# Patient Record
Sex: MALE | Race: WHITE | NOT HISPANIC OR LATINO | Employment: FULL TIME | URBAN - METROPOLITAN AREA
[De-identification: names, ages, dates, MRNs, and addresses within clinical notes are randomized per-mention and may not be internally consistent; named-entity substitution may affect disease eponyms.]

---

## 2017-12-27 ENCOUNTER — OFFICE VISIT (OUTPATIENT)
Dept: FAMILY MEDICINE | Facility: CLINIC | Age: 52
End: 2017-12-27

## 2017-12-27 VITALS
HEART RATE: 78 BPM | HEIGHT: 67 IN | DIASTOLIC BLOOD PRESSURE: 84 MMHG | TEMPERATURE: 98.5 F | WEIGHT: 193.4 LBS | BODY MASS INDEX: 30.35 KG/M2 | SYSTOLIC BLOOD PRESSURE: 132 MMHG | OXYGEN SATURATION: 95 %

## 2017-12-27 DIAGNOSIS — Z12.11 SPECIAL SCREENING FOR MALIGNANT NEOPLASMS, COLON: ICD-10-CM

## 2017-12-27 DIAGNOSIS — Z71.89 ACP (ADVANCE CARE PLANNING): ICD-10-CM

## 2017-12-27 DIAGNOSIS — R09.81 CONGESTION OF PARANASAL SINUS: ICD-10-CM

## 2017-12-27 DIAGNOSIS — Z00.00 ROUTINE GENERAL MEDICAL EXAMINATION AT A HEALTH CARE FACILITY: Primary | ICD-10-CM

## 2017-12-27 DIAGNOSIS — Z23 NEED FOR VACCINATION: ICD-10-CM

## 2017-12-27 PROCEDURE — 80053 COMPREHEN METABOLIC PANEL: CPT | Mod: 90 | Performed by: FAMILY MEDICINE

## 2017-12-27 PROCEDURE — 90715 TDAP VACCINE 7 YRS/> IM: CPT | Performed by: FAMILY MEDICINE

## 2017-12-27 PROCEDURE — 99386 PREV VISIT NEW AGE 40-64: CPT | Mod: 25 | Performed by: FAMILY MEDICINE

## 2017-12-27 PROCEDURE — 90471 IMMUNIZATION ADMIN: CPT | Performed by: FAMILY MEDICINE

## 2017-12-27 PROCEDURE — 82465 ASSAY BLD/SERUM CHOLESTEROL: CPT | Mod: 90 | Performed by: FAMILY MEDICINE

## 2017-12-27 PROCEDURE — 36415 COLL VENOUS BLD VENIPUNCTURE: CPT | Performed by: FAMILY MEDICINE

## 2017-12-27 PROCEDURE — 83718 ASSAY OF LIPOPROTEIN: CPT | Mod: 90 | Performed by: FAMILY MEDICINE

## 2017-12-27 PROCEDURE — 86803 HEPATITIS C AB TEST: CPT | Mod: 90 | Performed by: FAMILY MEDICINE

## 2017-12-27 PROCEDURE — 84153 ASSAY OF PSA TOTAL: CPT | Mod: 90 | Performed by: FAMILY MEDICINE

## 2017-12-27 NOTE — NURSING NOTE
"Jayjay Delcid is here for a CPX. Fasting: Not Today.    Pre-visit Planning  Immunizations -not up to date - Tdap Today  Colonoscopy -is due and to be scheduled by patient for later completion  Mammogram -NA  Asthma test --NA  PHQ9 -is completed today (Score: 0)  JULIETH 7 -NA  Fall Risk Assessment -NA    Vitals:  BP Cuff left  Arm with large Cuff  PULSE regular  193 lbs 6.4 oz and 5' 7.25\"  CLASSIFICATION OF OVERWEIGHT AND OBESITY BY BMI                        Obesity Class           BMI(kg/m2)  Underweight                                    < 18.5  Normal                                         18.5-24.9  Overweight                                     25.0-29.9  OBESITY                     I                  30.0-34.9                             II                 35.0-39.9  EXTREME OBESITY             III                >40      Patient's  BMI Body mass index is 30.07 kg/(m^2).  Http://hin.nhlbi.nih.gov/menuplanner/menu.cgi  My Chart: - accepts   Tobacco Use:  Questioned patient about current smoking habits.  Pt. has never smoked.  ETOH screening Questions:  1-How often do you have a drink containing alcohol?                             4 times per week(s)  2-How many drinks containing alcohol do you have on a typical day when you are         Drinking?                             1   3- How often do you have 5 or more drinks on one occasion?                             Never     Have you ever:  None of the patient's responses to the CAGE screening were positive / Negative CAGE score      The patient has verbalized that it is ok to leave a detailed voice message on the patient's cell phone with results/recommendations from this visit.     Verified Phone Number: [phone]    Roomed by: Lidia Sanders CMA      "

## 2017-12-27 NOTE — MR AVS SNAPSHOT
After Visit Summary   12/27/2017    Jayjay Delcid    MRN: [id number]           Patient Information     Date Of Birth          1965        Visit Information        Provider Department      12/27/2017 4:00 PM Chuck, Humberto Christopher, MD [facility]        Today's Diagnoses     Routine general medical examination at a health care facility    -  1    Congestion of paranasal sinus        ACP (advance care planning)        Need for vaccination        Special screening for malignant neoplasms, colon           Follow-ups after your visit        Additional Services     GASTROENTEROLOGY ADULT REF PROCEDURE ONLY       Last Lab Result: Creatinine (mg/dL)       Date                     Value                 09/17/2013               0.90             ----------  Body mass index is 30.07 kg/(m^2).     Needed:  No  Language:  English    Patient will be contacted to schedule procedure.     Please be aware that coverage of these services is subject to the terms and limitations of your health insurance plan.  Call member services at your health plan with any benefit or coverage questions.  Any procedures must be performed at a [facility] facility OR coordinated by your clinic's referral office.    Please bring the following with you to your appointment:    (1) Any X-Rays, CTs or MRIs which have been performed.  Contact the facility where they were done to arrange for  prior to your scheduled appointment.    (2) List of current medications   (3) This referral request   (4) Any documents/labs given to you for this referral                  Follow-up notes from your care team     Return in about 1 year (around 12/27/2018).      Who to contact     If you have questions or need follow up information about today's clinic visit or your schedule please contact [facility] directly at [phone].  Normal or non-critical lab and imaging results will be  "communicated to you by "[employer]hart, letter or phone within 4 business days after the clinic has received the results. If you do not hear from us within 7 days, please contact the clinic through [employer] or phone. If you have a critical or abnormal lab result, we will notify you by phone as soon as possible.  Submit refill requests through [employer] or call your pharmacy and they will forward the refill request to us. Please allow 3 business days for your refill to be completed.          Additional Information About Your Visit        [employer] Information     [employer] gives you secure access to your electronic health record. If you see a primary care provider, you can also send messages to your care team and make appointments. If you have questions, please call your primary care clinic.  If you do not have a primary care provider, please call [phone] and they will assist you.        Care EveryWhere ID     This is your Care EveryWhere ID. This could be used by other organizations to access your [place] medical records  [id number]        Your Vitals Were     Pulse Temperature Height Pulse Oximetry BMI (Body Mass Index)       78 98.5  F (36.9  C) (Oral) 1.708 m (5' 7.25\") 95% 30.07 kg/m2        Blood Pressure from Last 3 Encounters:   12/27/17 132/84   11/01/13 128/74   09/17/13 114/76    Weight from Last 3 Encounters:   12/27/17 87.7 kg (193 lb 6.4 oz)   11/01/13 74.2 kg (163 lb 9.6 oz)   09/17/13 72.6 kg (160 lb)              We Performed the Following     Cholesterol (QUEST)     COMPREHENSIVE METABOLIC PANEL (QUEST) [facility]     GASTROENTEROLOGY ADULT REF PROCEDURE ONLY     HCL PSA, SCREENING (QUEST)     HDL cholesterol (QUEST)     Hepatits C antibody (QUEST)     TDAP VACCINE (BOOSTRIX)     VACCINE ADMINISTRATION, INITIAL     VENOUS COLLECTION        Primary Care Provider Office Phone # Fax #    Humberto Woods [phone] [address] [address]        Equal Access " to Services     BAYLEE ZHU : Maggie Delgadillo, kota stephens, melinda hicks. So [facility] [phone].    ATENCIÓN: Si habla español, tiene a gallegos disposición servicios gratuitos de asistencia lingüística. Llame al [phone].    We comply with applicable federal civil rights laws and [place] laws. We do not discriminate on the basis of race, color, national origin, age, disability, sex, sexual orientation, or gender identity.            Thank you!     Thank you for choosing [facility]  for your care. Our goal is always to provide you with excellent care. Hearing back from our patients is one way we can continue to improve our services. Please take a few minutes to complete the written survey that you may receive in the mail after your visit with us. Thank you!             Your Updated Medication List - Protect others around you: Learn how to safely use, store and throw away your medicines at www.disposemymeds.org.      Notice  As of 12/27/2017  5:04 PM    You have not been prescribed any medications.

## 2017-12-28 LAB
ABBOTT PSA - QUEST: 0.4 NG/ML
ALBUMIN SERPL-MCNC: 4.5 G/DL (ref 3.6–5.1)
ALBUMIN/GLOB SERPL: 1.8 (CALC) (ref 1–2.5)
ALP SERPL-CCNC: 65 U/L (ref 40–115)
ALT SERPL-CCNC: 37 U/L (ref 9–46)
AST SERPL-CCNC: 28 U/L (ref 10–35)
BILIRUB SERPL-MCNC: 0.6 MG/DL (ref 0.2–1.2)
BUN SERPL-MCNC: 18 MG/DL (ref 7–25)
BUN/CREATININE RATIO: ABNORMAL (CALC) (ref 6–22)
CALCIUM SERPL-MCNC: 9.3 MG/DL (ref 8.6–10.3)
CHLORIDE SERPLBLD-SCNC: 105 MMOL/L (ref 98–110)
CHOLEST SERPL-MCNC: 230 MG/DL
CO2 SERPL-SCNC: 27 MMOL/L (ref 20–31)
CREAT SERPL-MCNC: 1.15 MG/DL (ref 0.7–1.33)
EGFR AFRICAN AMERICAN - QUEST: 83 ML/MIN/1.73M2
GFR SERPL CREATININE-BSD FRML MDRD: 72 ML/MIN/1.73M2
GLOBULIN, CALCULATED - QUEST: 2.5 G/DL (CALC) (ref 1.9–3.7)
GLUCOSE - QUEST: 102 MG/DL (ref 65–99)
HCV AB - QUEST: NORMAL
HDLC SERPL-MCNC: 33 MG/DL
POTASSIUM SERPL-SCNC: 4.1 MMOL/L (ref 3.5–5.3)
PROT SERPL-MCNC: 7 G/DL (ref 6.1–8.1)
SIGNAL TO CUT OFF - QUEST: 0.01
SODIUM SERPL-SCNC: 142 MMOL/L (ref 135–146)

## 2018-01-03 ENCOUNTER — OFFICE VISIT (OUTPATIENT)
Dept: FAMILY MEDICINE | Facility: CLINIC | Age: 53
End: 2018-01-03

## 2018-01-03 VITALS
HEIGHT: 68 IN | TEMPERATURE: 98.5 F | OXYGEN SATURATION: 97 % | HEART RATE: 66 BPM | WEIGHT: 191.4 LBS | DIASTOLIC BLOOD PRESSURE: 70 MMHG | SYSTOLIC BLOOD PRESSURE: 122 MMHG | BODY MASS INDEX: 29.01 KG/M2

## 2018-01-03 DIAGNOSIS — L91.8 SKIN TAG: Primary | ICD-10-CM

## 2018-01-03 PROCEDURE — 11200 RMVL SKIN TAGS UP TO&INC 15: CPT | Performed by: FAMILY MEDICINE

## 2018-01-03 NOTE — NURSING NOTE
Jayjay is here for skin tags he has on both eyes. Pt would like them removed    Pre-Visit Screening :  Immunizations : up to date  Colon Screening : is up to date  Asthma Action Test/Plan : tri  PHQ9/GAD7 :  Na    Pulse - regular  My Chart - accepts    CLASSIFICATION OF OVERWEIGHT AND OBESITY BY BMI                         Obesity Class           BMI(kg/m2)  Underweight                                    < 18.5  Normal                                         18.5-24.9  Overweight                                     25.0-29.9  OBESITY                     I                  30.0-34.9                              II                 35.0-39.9  EXTREME OBESITY             III                >40                             Patient's  BMI Body mass index is 22.15 kg/(m^2).  http://hin.nhlbi.nih.gov/menuplanner/menu.cgi  Questioned patient about current smoking habits.  Pt. has never smoked.  The patient has verbalized that it is ok to leave a detailed voice message on the patient's cell phone with results/recommendations from this visit.       Verified [phone] phone number:

## 2018-01-03 NOTE — MR AVS SNAPSHOT
"              After Visit Summary   1/3/2018    Jayjay Delcid    MRN: [id number]           Patient Information     Date Of Birth          1965        Visit Information        Provider Department      1/3/2018 5:00 PM Humberto Woods MD; [facility] PROCEDURE ROOM [facility]        Today's Diagnoses     Skin tag    -  1       Follow-ups after your visit        Who to contact     If you have questions or need follow up information about today's clinic visit or your schedule please contact [facility] directly at [phone].  Normal or non-critical lab and imaging results will be communicated to you by [employer], letter or phone within 4 business days after the clinic has received the results. If you do not hear from us within 7 days, please contact the clinic through [employer] or phone. If you have a critical or abnormal lab result, we will notify you by phone as soon as possible.  Submit refill requests through [employer] or call your pharmacy and they will forward the refill request to us. Please allow 3 business days for your refill to be completed.          Additional Information About Your Visit        MyChart Information     [employer] gives you secure access to your electronic health record. If you see a primary care provider, you can also send messages to your care team and make appointments. If you have questions, please call your primary care clinic.  If you do not have a primary care provider, please call [phone] and they will assist you.        Care EveryWhere ID     This is your Care EveryWhere ID. This could be used by other organizations to access your [place] medical records  [id number]        Your Vitals Were     Pulse Temperature Height Pulse Oximetry BMI (Body Mass Index)       66 98.5  F (36.9  C) (Oral) 1.727 m (5' 8\") 97% 29.1 kg/m2        Blood Pressure from Last 3 Encounters:   01/03/18 122/70   12/27/17 132/84   11/01/13 128/74    Weight " from Last 3 Encounters:   01/03/18 86.8 kg (191 lb 6.4 oz)   12/27/17 87.7 kg (193 lb 6.4 oz)   11/01/13 74.2 kg (163 lb 9.6 oz)              We Performed the Following     REMOVAL OF SKIN TAGS, FIRST 15        Primary Care Provider Office Phone # Fax #    Humberto Woods [phone] [address]  [facility] [address]        Equal Access to Services     MOOKIE ZHU : Hadii aad ku hadasho Soomaali, waaxda luqadaha, qaybta kaalmada adeegyada, waxay idiin hayaan adefabiola dozier . So [facility] [phone].    ATENCIÓN: Si habla español, tiene a gallegos disposición servicios gratuitos de asistencia lingüística. Llame al [phone].    We comply with applicable federal civil rights laws and [place] laws. We do not discriminate on the basis of race, color, national origin, age, disability, sex, sexual orientation, or gender identity.            Thank you!     Thank you for choosing [facility]  for your care. Our goal is always to provide you with excellent care. Hearing back from our patients is one way we can continue to improve our services. Please take a few minutes to complete the written survey that you may receive in the mail after your visit with us. Thank you!             Your Updated Medication List - Protect others around you: Learn how to safely use, store and throw away your medicines at www.disposemymeds.org.      Notice  As of 1/3/2018  5:36 PM    You have not been prescribed any medications.

## 2018-01-03 NOTE — PROGRESS NOTES
S: The patient complains of symptomatic skin tags on the eyelids. These are irritated by blinking and rubbing. One on the left bleeds easily    O: Patient appears well. Several benign skin tags are noted on the right and left eyelids-there also appears to be a keratin plug on right upper.     A: Skin tags     P: After verbal consent obtained including risks pain, bleeding, infection, scarring- Skin tags are snipped off using Betadine for cleansing and sterile iris scissors. Local anesthesia was not used due to location near eye. These pathognomonic lesions are not sent for pathology. Monsels was used to stop bleeding on largest lesion    abx ointment applied, .watch for signs infection

## 2018-03-16 ENCOUNTER — TRANSFERRED RECORDS (OUTPATIENT)
Dept: FAMILY MEDICINE | Facility: CLINIC | Age: 53
End: 2018-03-16

## 2019-12-16 ENCOUNTER — HEALTH MAINTENANCE LETTER (OUTPATIENT)
Age: 54
End: 2019-12-16

## 2020-06-25 ENCOUNTER — OFFICE VISIT (OUTPATIENT)
Dept: FAMILY MEDICINE | Facility: CLINIC | Age: 55
End: 2020-06-25

## 2020-06-25 VITALS
DIASTOLIC BLOOD PRESSURE: 80 MMHG | OXYGEN SATURATION: 95 % | HEART RATE: 82 BPM | WEIGHT: 194.8 LBS | TEMPERATURE: 98.2 F | SYSTOLIC BLOOD PRESSURE: 130 MMHG | BODY MASS INDEX: 29.62 KG/M2

## 2020-06-25 DIAGNOSIS — L03.119 CELLULITIS AND ABSCESS OF LEG: Primary | ICD-10-CM

## 2020-06-25 DIAGNOSIS — L02.419 CELLULITIS AND ABSCESS OF LEG: Primary | ICD-10-CM

## 2020-06-25 PROCEDURE — 10060 I&D ABSCESS SIMPLE/SINGLE: CPT | Performed by: FAMILY MEDICINE

## 2020-06-25 RX ORDER — CEPHALEXIN 500 MG/1
500 CAPSULE ORAL 3 TIMES DAILY
Qty: 21 CAPSULE | Refills: 0 | Status: SHIPPED | OUTPATIENT
Start: 2020-06-25 | End: 2021-10-25

## 2020-06-25 NOTE — PROGRESS NOTES
"SUBJECTIVE: Jayjay Delcid is a 55 year old male who presents for \"boil\" on left posterior thigh for 1 week, not improing with warm compress.  It is painful. No fevers or chills    Patient Active Problem List   Diagnosis     Health Care Home     ACP (advance care planning)     History reviewed. No pertinent past medical history.  Family History   Problem Relation Age of Onset     Breast Cancer Mother      Hypertension Father      Breast Cancer Paternal Grandmother      Cerebrovascular Disease No family hx of      C.A.D. No family hx of      Diabetes No family hx of      Cancer - colorectal No family hx of      Coronary Artery Disease No family hx of      Prostate Cancer No family hx of      Colon Cancer No family hx of      Social History     Socioeconomic History     Marital status:      Spouse name: Not on file     Number of children: Not on file     Years of education: Not on file     Highest education level: Not on file   Occupational History     Occupation:      Employer: [employer]   Social Needs     Financial resource strain: Not on file     Food insecurity     Worry: Not on file     Inability: Not on file     Transportation needs     Medical: Not on file     Non-medical: Not on file   Tobacco Use     Smoking status: Never Smoker     Smokeless tobacco: Never Used   Substance and Sexual Activity     Alcohol use: Yes     Alcohol/week: 1.7 standard drinks     Types: 2 drink(s) per week     Drug use: No     Sexual activity: Yes     Partners: Female     Birth control/protection: Condom, Diaphragm   Lifestyle     Physical activity     Days per week: Not on file     Minutes per session: Not on file     Stress: Not on file   Relationships     Social connections     Talks on phone: Not on file     Gets together: Not on file     Attends Mandaeism service: Not on file     Active member of club or organization: Not on file     Attends meetings of clubs or organizations: Not on file     " Relationship status: Not on file     Intimate partner violence     Fear of current or ex partner: Not on file     Emotionally abused: Not on file     Physically abused: Not on file     Forced sexual activity: Not on file   Other Topics Concern     Parent/sibling w/ CABG, MI or angioplasty before 65F 55M? Not Asked   Social History Narrative     Not on file     Past Surgical History:   Procedure Laterality Date     NO HISTORY OF SURGERY       No current outpatient medications on file prior to visit.  No current facility-administered medications on file prior to visit.        Allergies: Patient has no known allergies.    Immunization History   Administered Date(s) Administered     FLU 6-35 months 10/28/2009     Influenza (H1N1) 01/19/2010     Influenza (IIV3) PF 10/28/2009, 11/04/2010, 09/27/2011, 10/02/2013     Influenza Vaccine IM > 6 months Valent IIV4 10/01/2017, 10/02/2018, 10/03/2019     Influenza Vaccine IM Ages 6-35 Months 4 Valent (PF) 10/17/2017     TD (ADULT, 7+) 11/01/2006     TDAP Vaccine (Boostrix) 12/27/2017     Td (Adult), Adsorbed 11/01/2006        OBJECTIVE: /80 (BP Location: Right arm, Patient Position: Sitting, Cuff Size: Adult Large)   Pulse 82   Temp 98.2  F (36.8  C) (Oral)   Wt 88.4 kg (194 lb 12.8 oz)   SpO2 95%   BMI 29.62 kg/m       :   We have already discussed this procedure, including option of not performing surgery, technique of surgery and potential for scarring at a recent visit.    OBJECTIVE:   Patient appears well. Vitals are normal.  Skin: left upper posterior leg, 3 cm abscess, minimally fluctuant, tender, appears to have puruent matter below surface    ASSESSMENT:   skin abscess    PLAN:   After informed consent was obtained, using Betadine for cleansing and 1% Lidocaine with epinephrine for anesthetic, with sterile technique, incision and drainage of abscess was performed-moderate purulent matter drained, still some deep cavities palpable, sterile gauze wick pllace.  Antibiotic dressing is applied, and wound care instructions provided.  Be alert for any signs of cutaneous infection. The procedure was well tolerated without complications. Follow up: The patient may return prn..    abx started, f/u Monday for recheck

## 2020-06-25 NOTE — NURSING NOTE
Jayjay is here for boil on upper left thigh    Pre-visit Screening:  Immunizations:  up to date  Colonoscopy:  is up to date  Mammogram: NA  Asthma Action Test/Plan:  NA  PHQ9:  NA  GAD7:  NA  Questioned patient about current smoking habits Pt. has never smoked.  Ok to leave detailed message on voice mail for today's visit only Yes, phone # [phone]

## 2020-06-29 ENCOUNTER — OFFICE VISIT (OUTPATIENT)
Dept: FAMILY MEDICINE | Facility: CLINIC | Age: 55
End: 2020-06-29

## 2020-06-29 VITALS
DIASTOLIC BLOOD PRESSURE: 76 MMHG | HEIGHT: 68 IN | WEIGHT: 193 LBS | TEMPERATURE: 98.6 F | RESPIRATION RATE: 20 BRPM | HEART RATE: 61 BPM | OXYGEN SATURATION: 96 % | SYSTOLIC BLOOD PRESSURE: 122 MMHG | BODY MASS INDEX: 29.25 KG/M2

## 2020-06-29 DIAGNOSIS — L02.33 CARBUNCLE AND FURUNCLE OF BUTTOCK: Primary | ICD-10-CM

## 2020-06-29 PROCEDURE — 99024 POSTOP FOLLOW-UP VISIT: CPT | Performed by: FAMILY MEDICINE

## 2020-06-29 ASSESSMENT — MIFFLIN-ST. JEOR: SCORE: 1674.94

## 2020-06-29 NOTE — PROGRESS NOTES
SUBJECTIVE: 55 year old male complaining of recent incision and drainage of a boil with iodoform gauze inserted in the cavity for 4 day(s).   The patient describes marked improvement in pain, minimal discharge for now and tolerating antibiotic.   The patient denies a history of side effects.   Smoking history: No.   Relevant past medical history: negative.    OBJECTIVE: The patient appears healthy, alert, no distress, cooperative, smiling and over weight.   EXT: Right upper leg open incision with gauze noted. Minimal surrounding erythema. The lower extremities are normal and reveal no sign of DVT. Calves and thighs are soft and non tender, color is normal, no swelling or redness. Jacquie's sign is negative.  Pedal pulses are normal.    Area cleaned, gauze removed with minimal discharge  Dressing applied    ASSESSMENT: (L02.33) Carbuncle and furuncle of buttock  (primary encounter diagnosis)  Comment: gentle skin care/ dressing  Plan: finish out antibiotic. Expected healing course/ recheck prn problems.

## 2020-06-29 NOTE — NURSING NOTE
Chief Complaint   Patient presents with     RECHECK     recheck boil on left upper leg, taking gauze out today     Pre-visit Screening:  Immunizations:  up to date  Colonoscopy:  is up to date  Mammogram: NA  Asthma Action Test/Plan:  NA  PHQ9:  NA  GAD7:  NA  Questioned patient about current smoking habits Pt. has never smoked.  Ok to leave detailed message on voice mail for today's visit only Yes, phone # [phone]

## 2020-06-29 NOTE — PATIENT INSTRUCTIONS
Carbuncle and furuncle of buttock  (primary encounter diagnosis)  Comment: gentle skin care/ dressing  Plan: finish out antibiotic. Expected healing course/ recheck prn problems.

## 2021-01-15 ENCOUNTER — HEALTH MAINTENANCE LETTER (OUTPATIENT)
Age: 56
End: 2021-01-15

## 2021-09-04 ENCOUNTER — HEALTH MAINTENANCE LETTER (OUTPATIENT)
Age: 56
End: 2021-09-04

## 2021-10-25 ENCOUNTER — OFFICE VISIT (OUTPATIENT)
Dept: FAMILY MEDICINE | Facility: CLINIC | Age: 56
End: 2021-10-25

## 2021-10-25 VITALS
BODY MASS INDEX: 30.01 KG/M2 | OXYGEN SATURATION: 96 % | TEMPERATURE: 97.9 F | HEART RATE: 60 BPM | SYSTOLIC BLOOD PRESSURE: 120 MMHG | HEIGHT: 68 IN | DIASTOLIC BLOOD PRESSURE: 62 MMHG | WEIGHT: 198 LBS

## 2021-10-25 DIAGNOSIS — J20.9 ACUTE BRONCHITIS, UNSPECIFIED ORGANISM: Primary | ICD-10-CM

## 2021-10-25 PROCEDURE — 99213 OFFICE O/P EST LOW 20 MIN: CPT | Performed by: PHYSICIAN ASSISTANT

## 2021-10-25 RX ORDER — AZITHROMYCIN 250 MG/1
TABLET, FILM COATED ORAL
Qty: 6 TABLET | Refills: 0 | Status: SHIPPED | OUTPATIENT
Start: 2021-10-25 | End: 2021-12-01

## 2021-10-25 ASSESSMENT — MIFFLIN-ST. JEOR: SCORE: 1692.62

## 2021-10-25 NOTE — PROGRESS NOTES
"CC: Cough    History:  Jayjay had flu shot 10/6/2021. Within 2 days, started having cough. Seemed to get deeper, felt run down. Then the next week, felt body aches, feverish (but did not check temp). Had negative Covid-19 test 10/18/2021. Since the 18th, cough started to get stronger, more painful in upper chest with cough, developed head cold including running nose, but head cold already nearly gone.  Cough is occasionally productive, worse at night, gets wheezing at night.    Has taken Robitussin which works well to control cough temporarily. No history of asthma, pneumonia, COPD. Does have some chronic sinus congestion.     PMH, MEDICATIONS, ALLERGIES, SOCIAL AND FAMILY HISTORY in [facility] and reviewed by me personally.    ROS negative other than the symptoms noted above in the HPI.      Examination   /62 (BP Location: Left arm, Patient Position: Sitting, Cuff Size: Adult Large)   Pulse 60   Temp 97.9  F (36.6  C) (Oral)   Ht 1.727 m (5' 8\")   Wt 89.8 kg (198 lb)   SpO2 96%   BMI 30.11 kg/m       Constitutional: Sitting comfortably, in no acute distress. Vital signs noted  Eyes: pupils equal round reactive to light and accomodation, extra ocular movements intact  Ears: external canals and TMs free of abnormalities  Nose: patent, without mucosal abnormalities  Mouth and throat: without erythema or lesions of the mucosa  Neck:  no adenopathy, trachea midline and normal to palpation, thyroid normal to palpation  Cardiovascular:  regular rate and rhythm, no murmurs, clicks, or gallops  Respiratory:  normal respiratory rate and rhythm, lungs clear to auscultation  SKIN: No jaundice/pallor/rash.   Psychiatric: mentation appears normal and affect normal/bright    A/P    ICD-10-CM    1. Acute bronchitis, unspecified organism  J20.9 azithromycin (ZITHROMAX) 250 MG tablet       DISCUSSION:  Most consistent with acute bronchitis. Given duration of symptoms, may be bacterial. Recommended pt complete a z-pack " (azithromycin). Take with food. Warned of side effects including upset stomach, loose stool, rash, and to stop medication and contact me if side effects noted. Okay to continue using OTC cough suppressants as needed. Contact me in 1 week if not significantly better, or sooner with worsening.       follow up visit: As needed    Deanna Strange PA-C  [facility]

## 2021-10-25 NOTE — NURSING NOTE
Chief Complaint   Patient presents with     Cough     started feeling a cough about 15 days ago, negative covid test 7 days ago, feeling very tired also-- did have a flu vac on 10/6/2021       Pre-visit Screening:  Immunizations:  up to date  Colonoscopy:  UTD  Mammogram: NA  Asthma Action Test/Plan:  NA  PHQ9:  NA  GAD7:  NA  Questioned patient about current smoking habits Pt. has never smoked.  Ok to leave detailed message on voice mail for today's visit only Yes, phone # [phone]

## 2021-11-09 ENCOUNTER — OFFICE VISIT (OUTPATIENT)
Dept: FAMILY MEDICINE | Facility: CLINIC | Age: 56
End: 2021-11-09

## 2021-11-09 VITALS
HEIGHT: 68 IN | WEIGHT: 198 LBS | TEMPERATURE: 98.3 F | DIASTOLIC BLOOD PRESSURE: 78 MMHG | OXYGEN SATURATION: 96 % | SYSTOLIC BLOOD PRESSURE: 122 MMHG | BODY MASS INDEX: 30.01 KG/M2 | HEART RATE: 69 BPM

## 2021-11-09 DIAGNOSIS — J20.9 ACUTE BRONCHITIS, UNSPECIFIED ORGANISM: Primary | ICD-10-CM

## 2021-11-09 PROCEDURE — 99213 OFFICE O/P EST LOW 20 MIN: CPT | Performed by: PHYSICIAN ASSISTANT

## 2021-11-09 PROCEDURE — 71046 X-RAY EXAM CHEST 2 VIEWS: CPT | Performed by: PHYSICIAN ASSISTANT

## 2021-11-09 RX ORDER — DOXYCYCLINE HYCLATE 100 MG
100 TABLET ORAL 2 TIMES DAILY
Qty: 20 TABLET | Refills: 0 | Status: SHIPPED | OUTPATIENT
Start: 2021-11-09 | End: 2021-11-19

## 2021-11-09 ASSESSMENT — MIFFLIN-ST. JEOR: SCORE: 1692.62

## 2021-11-09 NOTE — NURSING NOTE
Chief Complaint   Patient presents with     Cough     cough for a month (started on 10/10/21), had a flu shot on 10/6/2021, deep cough through the day and worse in the evening.         Pre-visit Screening:  Immunizations:  up to date  Colonoscopy:  is up to date  Mammogram: NA  Asthma Action Test/Plan:  NA  PHQ9:  NA  GAD7:  NA  Questioned patient about current smoking habits Pt. has never smoked.  Ok to leave detailed message on voice mail for today's visit only Yes, phone # [phone]

## 2021-11-09 NOTE — PROGRESS NOTES
"CC: Recheck Cough    History:  Jayjay returns today after being seen 10/25/2021 with cough that started 10/8/2021. He was treated with azithromycin for bronchitis. Since that time, cough does seem mildly improved, but still significant. Azithromycin did not seem to lead to much improvement. However, he is needing less robitussin, but cough is still more substantial at night, and with changes in activity. Does have history of seasonal allergies.     PMH, MEDICATIONS, ALLERGIES, SOCIAL AND FAMILY HISTORY in [facility] and reviewed by me personally.    ROS negative other than the symptoms noted above in the HPI.      Examination   /78 (BP Location: Left arm, Patient Position: Sitting, Cuff Size: Adult Large)   Pulse 69   Temp 98.3  F (36.8  C) (Oral)   Ht 1.727 m (5' 8\")   Wt 89.8 kg (198 lb)   SpO2 96%   BMI 30.11 kg/m       Constitutional: Sitting comfortably, in no acute distress. Vital signs noted  Nose: patent, without mucosal abnormalities  Mouth and throat: without erythema or lesions of the mucosa  Neck:  no adenopathy, trachea midline and normal to palpation, thyroid normal to palpation  Cardiovascular:  regular rate and rhythm, no murmurs, clicks, or gallops  Respiratory:  normal respiratory rate and rhythm, lungs clear to auscultation  SKIN: No jaundice/pallor/rash.   Psychiatric: mentation appears normal and affect normal/bright        A/P    ICD-10-CM    1. Acute bronchitis, unspecified organism  J20.9 XR Chest 2 Views     doxycycline hyclate (VIBRA-TABS) 100 MG tablet       DISCUSSION:  CXR today appears negative, but will contact pt with radiology report when available. Recommended trial of doxycycline twice daily with food for 10 days. Warned of side effects, contact me if noted. Contact me in 1 week if not significantly better, or sooner with worsening.     follow up visit: As needed    Deanna Strange PA-C  [facility]    "

## 2021-12-01 ENCOUNTER — OFFICE VISIT (OUTPATIENT)
Dept: FAMILY MEDICINE | Facility: CLINIC | Age: 56
End: 2021-12-01

## 2021-12-01 VITALS
HEART RATE: 72 BPM | RESPIRATION RATE: 20 BRPM | DIASTOLIC BLOOD PRESSURE: 82 MMHG | TEMPERATURE: 97.1 F | SYSTOLIC BLOOD PRESSURE: 136 MMHG | WEIGHT: 206 LBS | BODY MASS INDEX: 31.22 KG/M2 | HEIGHT: 68 IN

## 2021-12-01 DIAGNOSIS — R05.9 COUGH: Primary | ICD-10-CM

## 2021-12-01 PROCEDURE — 99213 OFFICE O/P EST LOW 20 MIN: CPT | Performed by: FAMILY MEDICINE

## 2021-12-01 RX ORDER — PREDNISONE 20 MG/1
40 TABLET ORAL DAILY
Qty: 10 TABLET | Refills: 0 | Status: SHIPPED | OUTPATIENT
Start: 2021-12-01 | End: 2022-03-21

## 2021-12-01 ASSESSMENT — MIFFLIN-ST. JEOR: SCORE: 1728.91

## 2021-12-01 NOTE — PROGRESS NOTES
"SUBJECTIVE:   Jayjay Delcid is a 56 year old male who complains of dry cough for 8 weeks -had other cold symptoms initially but all resolved. He denies a current history of productive cough, sweats, chills, myalgias, shortness of breath, dizzyness, vomiting and chest pain and does not have a history of asthma. Patient does not smoke cigarettes.     Pt had neg covid test 10/18    Jayjay was seen 10/25/2021 with cough that started 10/8/2021. He was treated with azithromycin for bronchitis. Since that time, cough does seem mildly improved, but still significant. Azithromycin did not seem to lead to much improvement. However, he is needing less robitussin, but cough is still more substantial at night, and with changes in activity. Does have history of seasonal allergies.     Pt then seen 11/9/21- CXR neg, treated with doxy for possible persistent bronchitis-somewhat better but some cough perists    Pt denies current wheezing    Pt has had heartburn for some time, takes Tums daily 4-5 times      OBJECTIVE:/82 (BP Location: Left arm, Patient Position: Chair, Cuff Size: Adult Regular)   Pulse 72   Temp 97.1  F (36.2  C)   Resp 20   Ht 1.727 m (5' 8\")   Wt 93.4 kg (206 lb)   BMI 31.32 kg/m     He appears well, vital signs are as noted by the nurse. Ears normal.  Throat and pharynx normal.  Neck supple. No adenopathy in the neck. Nose is congested. Sinuses non tender. The chest is clear, without wheezes or rales.    ASSESSMENT:   Cough-differential diagnosis includes infection vs. allergies vs. asthma vs. gastroesophageal reflux disease vs. medication side effect - I suspect any infectious issues are resolved by now after abx x 2 and 8 weeks, doubt post viral symptoms would last this long but can't rule out     Pt does have allergies so may be worsening allergies with asthma like symptoms , already on flonase daily    Pt does have incontrolled GERD which certainly can contribute to cough    PLAN:we agree to try " steroid burst to help any asthma like/post viral inflammation, also start pepcid BID daily to control acid contribution- expect these to settle cough    f/u if not improving or worsening but MyChart       Symptomatic therapy suggested: . Call or return to clinic prn if these symptoms worsen or fail to improve as anticipated.

## 2021-12-01 NOTE — NURSING NOTE
Jayjay Delcid is here for a recheck of his cough. Has been on antibiotics x2 since the cough started. Has been having pain in the lower right side as well.    Questioned patient about current smoking habits.  Pt. has never smoked.  PULSE regular  My Chart: active  CLASSIFICATION OF OVERWEIGHT AND OBESITY BY BMI                        Obesity Class           BMI(kg/m2)  Underweight                                    < 18.5  Normal                                         18.5-24.9  Overweight                                     25.0-29.9  OBESITY                     I                  30.0-34.9                             II                 35.0-39.9  EXTREME OBESITY             III                >40                            Patient's  BMI Body mass index is 31.32 kg/m .  http://hin.nhlbi.nih.gov/menuplanner/menu.cgi  Pre-visit planning  Immunizations - up to date  Colonoscopy - is up to date  Mammogram -   Asthma -   PHQ9 -    JULIETH-7 -    Hearing Test -

## 2022-02-15 NOTE — PROGRESS NOTES
---------------------  From: Millie Espinoza CMA (Phone Messages Pool ([id number]))   To: [employer] Message Pool ([id number]);     Sent: 4/14/2021 9:14:28 AM CDT  Subject: phone note- Amantadine     Time: 9:11 am  Note: Patient called asking for her Amantadine to be sent to [facility] mail order. Requesting 90 day supply.     seen today---------------------  From: Freya Mark CMA ([employer] Message Pool ([id number]_[facility]))   To: Orestes Mendosa MD;     Sent: 4/14/2021 9:27:05 AM CDT  Subject: FW: phone note- Amantadine---------------------  From: Orestes Mendosa MD   To: [employer] Message Pool ([id number]);     Sent: 4/14/2021 10:57:16 AM CDT  Subject: RE: phone note- Amantadine     I'm okay with Rxper BR BI scheduled - rx sent, pt contacted and message left  ** Submitted: **  Order:amantadine (amantadine 100 mg oral capsule)  1 cap(s)  Oral  bid  Qty:  180 cap(s)        Refills:  1          Substitutions Allowed     PRN  Other (see comment)      Route To Pharmacy - [facility]    Signed by Freya Mark CMA  4/14/2021 5:28:00 PM [facility]    ** Documented **  Discontinue:amantadine (amantadine 100 mg oral capsule)   Signed by Freya Mark CMA  4/14/2021 5:28:00 PM [facility]   SUBJECTIVE:   CC: Jayjay Delcid is an 52 year old male who presents for preventative health visit.     Healthy Habits:    Do you get at least three servings of calcium containing foods daily (dairy, green leafy vegetables, etc.)? yes    Amount of exercise or daily activities, outside of work: 0 day(s) per week    Problems taking medications regularly No    Medication side effects: No    Have you had an eye exam in the past two years? yes    Do you see a dentist twice per year? yes    Do you have sleep apnea, excessive snoring or daytime drowsiness?no       Skin tags on eyelids- wants removed, bother him when he blinks    Sinus congestion for a year- was given abx and Mucinex and flonase, sinus rinse at work clinic-symptoms better but when he stopped meds symptoms returned    Today's PHQ-2 Score:   PHQ-2 ( 1999 Pfizer) 12/27/2017   Q1: Little interest or pleasure in doing things 0   Q2: Feeling down, depressed or hopeless 0   PHQ-2 Score 0         Abuse: Current or Past(Physical, Sexual or Emotional)- No  Do you feel safe in your environment - Yes  Social History   Substance Use Topics     Smoking status: Never Smoker     Smokeless tobacco: Never Used     Alcohol use 1.0 oz/week     2 drink(s) per week      If you drink alcohol do you typically have >3 drinks per day or >7 drinks per week?                       Last PSA:   Abbott PSA   Date Value Ref Range Status   09/17/2013 0.5 < OR = 4.0 ng/mL Final     Comment:        This test was performed using the Siemens  chemiluminescent method. Values obtained from  different assay methods cannot be used  interchangeably. PSA levels, regardless of  value, should not be interpreted as absolute  evidence of the presence or absence of disease.          Reviewed orders with patient. Reviewed health maintenance and updated orders accordingly - Yes  BP Readings from Last 3 Encounters:   12/27/17 132/84   11/01/13 128/74   09/17/13 114/76    Wt Readings from Last 3 Encounters:    12/27/17 87.7 kg (193 lb 6.4 oz)   11/01/13 74.2 kg (163 lb 9.6 oz)   09/17/13 72.6 kg (160 lb)                  Patient Active Problem List   Diagnosis     Health Care Home     ACP (advance care planning)     Past Surgical History:   Procedure Laterality Date     NO HISTORY OF SURGERY         Social History   Substance Use Topics     Smoking status: Never Smoker     Smokeless tobacco: Never Used     Alcohol use 1.0 oz/week     2 drink(s) per week     Family History   Problem Relation Age of Onset     Breast Cancer Mother      Hypertension Father      Breast Cancer Paternal Grandmother      CEREBROVASCULAR DISEASE No family hx of      C.A.D. No family hx of      DIABETES No family hx of      Cancer - colorectal No family hx of      Coronary Artery Disease No family hx of      Prostate Cancer No family hx of      Colon Cancer No family hx of          No current outpatient prescriptions on file.     No Known Allergies  Recent Labs   Lab Test  09/17/13   1016   LDL  134*   HDL  38*   TRIG  67   ALT  26   CR  0.90   GFRESTIMATED  99   POTASSIUM  4.1              Reviewed and updated as needed this visit by clinical staffTobacco  Allergies  Meds  Problems         Reviewed and updated as needed this visit by Provider        No past medical history on file.   Past Surgical History:   Procedure Laterality Date     NO HISTORY OF SURGERY         ROS:  C: NEGATIVE for fever, chills, change in weight  I: NEGATIVE for worrisome rashes, moles or lesions  E: NEGATIVE for vision changes or irritation  ENT: NEGATIVE for ear, mouth and throat problems  R: NEGATIVE for significant cough or SOB  CV: NEGATIVE for chest pain, palpitations or peripheral edema  GI: NEGATIVE for nausea, abdominal pain, heartburn, or change in bowel habits   male: negative for dysuria, hematuria, decreased urinary stream, erectile dysfunction, urethral discharge  M: NEGATIVE for significant arthralgias or myalgia  N: NEGATIVE for weakness, dizziness  "or paresthesias  E: NEGATIVE for temperature intolerance, skin/hair changes  P: NEGATIVE for changes in mood or affect    OBJECTIVE:   /84 (BP Location: Left arm, Patient Position: Chair, Cuff Size: Adult Large)  Pulse 78  Temp 98.5  F (36.9  C) (Oral)  Ht 1.708 m (5' 7.25\")  Wt 87.7 kg (193 lb 6.4 oz)  SpO2 95%  BMI 30.07 kg/m2  EXAM:  GENERAL: healthy, alert and no distress  EYES: Eyes grossly normal to inspection, PERRL and conjunctivae and sclerae normal  HENT: ear canals and TM's normal, nose and mouth without ulcers or lesions  NECK: no adenopathy, no asymmetry, masses, or scars and thyroid normal to palpation  RESP: lungs clear to auscultation - no rales, rhonchi or wheezes  CV: regular rate and rhythm, normal S1 S2, no S3 or S4, no murmur, click or rub, no peripheral edema and peripheral pulses strong  ABDOMEN: soft, nontender, no hepatosplenomegaly, no masses and bowel sounds normal   (male): normal male genitalia without lesions or urethral discharge, no hernia  RECTAL (male): deferred  MS: no gross musculoskeletal defects noted, no edema  SKIN: no suspicious lesions or rashes  NEURO: Normal strength and tone, mentation intact and speech normal  PSYCH: mentation appears normal, affect normal/bright    ASSESSMENT/PLAN:   (Z00.00) Routine general medical examination at a health care facility  (primary encounter diagnosis)  Comment: discussed preventitive healthcare   Plan: GASTROENTEROLOGY ADULT REF PROCEDURE ONLY,         Cholesterol ([facility]), HDL cholesterol ([facility]),         COMPREHENSIVE METABOLIC PANEL ([facility]) XCMP,         Hepatits C antibody ([facility]), VENOUS COLLECTION,        HCL PSA, SCREENING ([facility])        Continue to work on healthy diet and exercise, discussed healthy habits     (R09.81) Congestion of paranasal sinus  Comment: likely allergic as responds to flonase and sinus wash  Plan: recommned using these more long term    (Z71.89) ACP (advance care planning)  Comment:   Plan: " "    (Z23) Need for vaccination  Comment:   Plan: TDAP VACCINE (BOOSTRIX), VACCINE         ADMINISTRATION, INITIAL            (Z12.11) Special screening for malignant neoplasms, colon  Comment:   Plan: GASTROENTEROLOGY ADULT REF PROCEDURE ONLY              COUNSELING:  Reviewed preventive health counseling, as reflected in patient instructions       Regular exercise       Healthy diet/nutrition       Vision screening       Hearing screening       Immunizations    Vaccinated for: TDAP           Consider Hep C screening for patients born between 1945 and 1965       Colon cancer screening       Prostate cancer screening      BP Screening:   Last 3 BP Readings:    BP Readings from Last 3 Encounters:   12/27/17 132/84   11/01/13 128/74   09/17/13 114/76       The following was recommended to the patient:  Re-screen BP within a year and recommended lifestyle modifications   reports that he has never smoked. He has never used smokeless tobacco.      Estimated body mass index is 30.07 kg/(m^2) as calculated from the following:    Height as of this encounter: 1.708 m (5' 7.25\").    Weight as of this encounter: 87.7 kg (193 lb 6.4 oz).   Weight management plan: Discussed healthy diet and exercise guidelines and patient will follow up in 12 months in clinic to re-evaluate.    Counseling Resources:  ATP IV Guidelines  Pooled Cohorts Equation Calculator  FRAX Risk Assessment  ICSI Preventive Guidelines  Dietary Guidelines for Americans, 2010  USDA's MyPlate  ASA Prophylaxis  Lung CA Screening    Humberto Woods MD  [facility]  "

## 2022-02-19 ENCOUNTER — HEALTH MAINTENANCE LETTER (OUTPATIENT)
Age: 57
End: 2022-02-19

## 2022-03-21 ENCOUNTER — OFFICE VISIT (OUTPATIENT)
Dept: FAMILY MEDICINE | Facility: CLINIC | Age: 57
End: 2022-03-21

## 2022-03-21 VITALS
SYSTOLIC BLOOD PRESSURE: 122 MMHG | HEART RATE: 80 BPM | BODY MASS INDEX: 31.22 KG/M2 | TEMPERATURE: 97.3 F | RESPIRATION RATE: 20 BRPM | WEIGHT: 206 LBS | HEIGHT: 68 IN | DIASTOLIC BLOOD PRESSURE: 68 MMHG

## 2022-03-21 DIAGNOSIS — R05.9 COUGH: Primary | ICD-10-CM

## 2022-03-21 PROCEDURE — 99213 OFFICE O/P EST LOW 20 MIN: CPT | Performed by: FAMILY MEDICINE

## 2022-03-21 RX ORDER — FAMOTIDINE 20 MG/1
20 TABLET, FILM COATED ORAL 2 TIMES DAILY
COMMUNITY
Start: 2022-03-21 | End: 2023-01-12

## 2022-03-21 NOTE — PROGRESS NOTES
"SUBJECTIVE:   Jayjay Delcid is a 57 year old male who complains of mild sore throat and dry cough for 14 days. He denies a history of productive cough, sweats, chills, myalgias, shortness of breath, nausea, vomiting and chest pain and denies a history of asthma. Patient does not smoke cigarettes.    He states he only has cough at this point-other symptoms resolved    Pt also has some heartburn- was started on pepcid 12/21 after ongoing cough and it helped-now coughing despite pepcid    Pt was seen 12/1/21 for chronic cough-notes reviewed-suspected post viral and GERD     OBJECTIVE:/68 (BP Location: Left arm, Patient Position: Chair, Cuff Size: Adult Large)   Pulse 80   Temp 97.3  F (36.3  C)   Resp 20   Ht 1.727 m (5' 8\")   Wt 93.4 kg (206 lb)   BMI 31.32 kg/m     He appears well, vital signs are as noted by the nurse. Ears normal.  Throat and pharynx normal.  Neck supple. No adenopathy in the neck. Nose is congested. Sinuses non tender. The chest is clear, without wheezes or rales.    ASSESSMENT:   Cough-differential diagnosis includes infection vs. allergies vs. asthma vs. gastroesophageal reflux disease vs. medication side effect -given response to pepcid in past suspect this may still be GERD issue-no clear infectious signs, no hx asthma, no allergy symptoms         PLAN:2 weeks omeprazole, if not better consider spirometry/asthma testing  Symptomatic therapy suggested: push fluids. Call or return to clinic prn if these symptoms worsen or fail to improve as anticipated.   "

## 2022-03-21 NOTE — NURSING NOTE
Jayjay WALLER Mixter is here for a cough for the past 2 weeks. Is taking antacid. Seemed to work for a couple months, but feels not working as well    Questioned patient about current smoking habits.  Pt. has never smoked.  PULSE regular  My Chart: active  CLASSIFICATION OF OVERWEIGHT AND OBESITY BY BMI                        Obesity Class           BMI(kg/m2)  Underweight                                    < 18.5  Normal                                         18.5-24.9  Overweight                                     25.0-29.9  OBESITY                     I                  30.0-34.9                             II                 35.0-39.9  EXTREME OBESITY             III                >40                            Patient's  BMI Body mass index is 31.32 kg/m .  http://hin.nhlbi.nih.gov/menuplanner/menu.cgi  Pre-visit planning  Immunizations - up to date  Colonoscopy - is up to date  Mammogram -   Asthma -   PHQ9 -    JULIETH-7 -

## 2022-04-18 ENCOUNTER — MYC MEDICAL ADVICE (OUTPATIENT)
Dept: FAMILY MEDICINE | Facility: CLINIC | Age: 57
End: 2022-04-18

## 2022-04-18 DIAGNOSIS — R05.9 COUGH: ICD-10-CM

## 2022-04-18 NOTE — TELEPHONE ENCOUNTER
Pt would like to continue taking omeprazole for continued cough and acid reflux.    Please advise, thanks.

## 2022-07-01 DIAGNOSIS — R05.9 COUGH: ICD-10-CM

## 2022-07-01 NOTE — TELEPHONE ENCOUNTER
Pt in [place] needs short supply sent to pharmacy while out of town.    Jayjay Delcid is requesting a refill of:    Pending Prescriptions:                       Disp   Refills    omeprazole (PRILOSEC) 20 MG DR capsule    5 caps*0            Sig: Take 1 capsule (20 mg) by mouth daily

## 2022-07-16 DIAGNOSIS — R05.9 COUGH: ICD-10-CM

## 2022-07-18 NOTE — TELEPHONE ENCOUNTER
Received incoming refill request for  Pending Prescriptions:                       Disp   Refills    omeprazole (PRILOSEC) 20 MG DR capsule [P*90 cap*             Sig: TAKE 1 CAPSULE BY MOUTH EVERY DAY    Patient is due for a med check appt and short supply already sent in.

## 2022-07-20 DIAGNOSIS — R05.9 COUGH: ICD-10-CM

## 2022-07-20 NOTE — TELEPHONE ENCOUNTER
Jayjay Delcid is requesting a refill of:    Pending Prescriptions:                       Disp   Refills    omeprazole (PRILOSEC) 20 MG DR capsule [P*90 cap*1            Sig: TAKE 1 CAPSULE BY MOUTH EVERY DAY    Pt last seen on 3/21/22

## 2022-10-22 ENCOUNTER — HEALTH MAINTENANCE LETTER (OUTPATIENT)
Age: 57
End: 2022-10-22

## 2023-01-12 ENCOUNTER — OFFICE VISIT (OUTPATIENT)
Dept: FAMILY MEDICINE | Facility: CLINIC | Age: 58
End: 2023-01-12

## 2023-01-12 VITALS
TEMPERATURE: 97 F | HEIGHT: 68 IN | HEART RATE: 64 BPM | DIASTOLIC BLOOD PRESSURE: 64 MMHG | BODY MASS INDEX: 24.25 KG/M2 | SYSTOLIC BLOOD PRESSURE: 104 MMHG | WEIGHT: 160 LBS | RESPIRATION RATE: 20 BRPM

## 2023-01-12 DIAGNOSIS — Z13.220 SCREENING FOR LIPID DISORDERS: ICD-10-CM

## 2023-01-12 DIAGNOSIS — Z78.9 KETOGENIC DIET: ICD-10-CM

## 2023-01-12 DIAGNOSIS — Z00.00 ROUTINE GENERAL MEDICAL EXAMINATION AT A HEALTH CARE FACILITY: Primary | ICD-10-CM

## 2023-01-12 DIAGNOSIS — Z13.1 SCREENING FOR DIABETES MELLITUS: ICD-10-CM

## 2023-01-12 DIAGNOSIS — Z12.5 SPECIAL SCREENING FOR MALIGNANT NEOPLASM OF PROSTATE: ICD-10-CM

## 2023-01-12 DIAGNOSIS — K21.9 GASTROESOPHAGEAL REFLUX DISEASE WITHOUT ESOPHAGITIS: ICD-10-CM

## 2023-01-12 LAB
ALBUMIN SERPL-MCNC: 4.5 G/DL (ref 3.6–5.1)
ALBUMIN/GLOB SERPL: 2 {RATIO} (ref 1–2.5)
ALP SERPL-CCNC: 38 U/L (ref 33–130)
ALT 1742-6: 13 U/L (ref 0–32)
AST 1920-8: 12 U/L (ref 0–35)
BILIRUB SERPL-MCNC: 1 MG/DL (ref 0.2–1.2)
BUN SERPL-MCNC: 16 MG/DL (ref 7–25)
BUN/CREATININE RATIO: 16.8 (ref 6–22)
CALCIUM SERPL-MCNC: 9.7 MG/DL (ref 8.6–10.3)
CHLORIDE SERPLBLD-SCNC: 102.6 MMOL/L (ref 98–110)
CHOLEST SERPL-MCNC: 241 MG/DL (ref 0–199)
CHOLEST/HDLC SERPL: 4 {RATIO} (ref 0–5)
CO2 SERPL-SCNC: 31.4 MMOL/L (ref 20–32)
CREAT SERPL-MCNC: 0.95 MG/DL (ref 0.6–1.3)
GLOBULIN, CALCULATED - QUEST: 2.3 (ref 1.9–3.7)
GLUCOSE SERPL-MCNC: 86 MG/DL (ref 60–99)
HDLC SERPL-MCNC: 66 MG/DL (ref 40–150)
LDLC SERPL CALC-MCNC: 163 MG/DL (ref 0–130)
POTASSIUM SERPL-SCNC: 4.22 MMOL/L (ref 3.5–5.3)
PROT SERPL-MCNC: 6.8 G/DL (ref 6.1–8.1)
SODIUM SERPL-SCNC: 140 MMOL/L (ref 135–146)
TRIGL SERPL-MCNC: 60 MG/DL (ref 0–149)

## 2023-01-12 PROCEDURE — 80061 LIPID PANEL: CPT | Performed by: FAMILY MEDICINE

## 2023-01-12 PROCEDURE — 80053 COMPREHEN METABOLIC PANEL: CPT | Performed by: FAMILY MEDICINE

## 2023-01-12 PROCEDURE — 99396 PREV VISIT EST AGE 40-64: CPT | Performed by: FAMILY MEDICINE

## 2023-01-12 PROCEDURE — 36415 COLL VENOUS BLD VENIPUNCTURE: CPT | Performed by: FAMILY MEDICINE

## 2023-01-12 NOTE — PROGRESS NOTES
3  SUBJECTIVE:   CC: Jayjay Delcid is an 57 year old male who presents for preventive health visit.       Patient has been advised of split billing requirements and indicates understanding: Yes  Healthy Habits:    Do you get at least three servings of calcium containing foods daily (dairy, green leafy vegetables, etc.)? yes    Amount of exercise or daily activities, outside of work: 5 day(s) per week    Problems taking medications regularly No    Medication side effects: No    Have you had an eye exam in the past two years? yes    Do you see a dentist twice per year? yes    Do you have sleep apnea, excessive snoring or daytime drowsiness?no      Pt has lost weight through keto diet and exercise    Today's PHQ-2 Score:   PHQ-2 ( 1999 Pfizer) 1/12/2023 3/21/2022   Q1: Little interest or pleasure in doing things 0 0   Q2: Feeling down, depressed or hopeless 0 0   PHQ-2 Score 0 0   PHQ-2 Total Score (12-17 Years)- Positive if 3 or more points; Administer PHQ-A if positive - -       Abuse: Current or Past(Physical, Sexual or Emotional)- No  Do you feel safe in your environment? Yes    Have you ever done Advance Care Planning? (For example, a Health Directive, POLST, or a discussion with a medical provider or your loved ones about your wishes): No, advance care planning information given to patient to review.  Patient declined advance care planning discussion at this time.    Social History     Tobacco Use     Smoking status: Never     Smokeless tobacco: Never   Substance Use Topics     Alcohol use: Yes     Alcohol/week: 1.7 standard drinks     Types: 2 drink(s) per week     If you drink alcohol do you typically have >3 drinks per day or >7 drinks per week? No                      Last PSA:   Abbott PSA   Date Value Ref Range Status   12/27/2017 0.4 < OR = 4.0 ng/mL Final     Comment:     The total PSA value from this assay system is   standardized against the WHO standard. The test   result will be approximately 20%  lower when compared   to the equimolar-standardized total PSA ([facility]   [place]). Comparison of serial PSA results should be   interpreted with this fact in mind.     This test was performed using the Siemens   chemiluminescent method. Values obtained from   different assay methods cannot be used  interchangeably. PSA levels, regardless of  value, should not be interpreted as absolute  evidence of the presence or absence of disease.         Reviewed orders with patient. Reviewed health maintenance and updated orders accordingly - Yes  BP Readings from Last 3 Encounters:   01/12/23 104/64   03/21/22 122/68   12/01/21 136/82    Wt Readings from Last 3 Encounters:   01/12/23 72.6 kg (160 lb)   03/21/22 93.4 kg (206 lb)   12/01/21 93.4 kg (206 lb)                  Patient Active Problem List   Diagnosis     Health Care Home     ACP (advance care planning)     Past Surgical History:   Procedure Laterality Date     NO HISTORY OF SURGERY         Social History     Tobacco Use     Smoking status: Never     Smokeless tobacco: Never   Substance Use Topics     Alcohol use: Yes     Alcohol/week: 1.7 standard drinks     Types: 2 drink(s) per week     Family History   Problem Relation Age of Onset     Breast Cancer Mother      Hypertension Father      Brain Cancer Brother      Brain Tumor Brother      Breast Cancer Paternal Grandmother      Cerebrovascular Disease No family hx of      C.A.D. No family hx of      Diabetes No family hx of      Cancer - colorectal No family hx of      Coronary Artery Disease No family hx of      Prostate Cancer No family hx of      Colon Cancer No family hx of          No current outpatient medications on file.     No Known Allergies  Recent Labs   Lab Test 12/27/17  1652   HDL 33*   ALT 37   CR 1.15   GFRESTIMATED 72   POTASSIUM 4.1        Reviewed and updated as needed this visit by clinical staff   Tobacco                Reviewed and updated as needed this visit by Provider                 No  "past medical history on file.   Past Surgical History:   Procedure Laterality Date     NO HISTORY OF SURGERY         ROS:  CONSTITUTIONAL: NEGATIVE for fever, chills, change in weight  INTEGUMENTARY/SKIN: NEGATIVE for worrisome rashes, moles or lesions  EYES: NEGATIVE for vision changes or irritation  ENT: NEGATIVE for ear, mouth and throat problems  RESP: NEGATIVE for significant cough or SOB  CV: NEGATIVE for chest pain, palpitations or peripheral edema  GI: NEGATIVE for nausea, abdominal pain, heartburn, or change in bowel habits   male: negative for dysuria, hematuria, decreased urinary stream, erectile dysfunction, urethral discharge  MUSCULOSKELETAL: NEGATIVE for significant arthralgias or myalgia  NEURO: NEGATIVE for weakness, dizziness or paresthesias  ENDOCRINE: NEGATIVE for temperature intolerance, skin/hair changes  PSYCHIATRIC: NEGATIVE for changes in mood or affect    OBJECTIVE:   /64 (BP Location: Left arm, Patient Position: Chair, Cuff Size: Adult Regular)   Pulse 64   Temp 97  F (36.1  C) (Temporal)   Resp 20   Ht 1.727 m (5' 8\")   Wt 72.6 kg (160 lb)   BMI 24.33 kg/m    EXAM:  GENERAL: healthy, alert and no distress  EYES: Eyes grossly normal to inspection, PERRL and conjunctivae and sclerae normal  HENT: ear canals and TM's normal, nose and mouth without ulcers or lesions  NECK: no adenopathy, no asymmetry, masses, or scars and thyroid normal to palpation  RESP: lungs clear to auscultation - no rales, rhonchi or wheezes  CV: regular rate and rhythm, normal S1 S2, no S3 or S4, no murmur, click or rub, no peripheral edema and peripheral pulses strong  ABDOMEN: soft, nontender, no hepatosplenomegaly, no masses and bowel sounds normal   (male): normal male genitalia without lesions or urethral discharge, no hernia  MS: no gross musculoskeletal defects noted, no edema  SKIN: no suspicious lesions or rashes  NEURO: Normal strength and tone, mentation intact and speech normal  PSYCH: " "mentation appears normal, affect normal/bright    Diagnostic Test Results:  Labs reviewed in Epic    ASSESSMENT/PLAN:   (Z00.00) Routine general medical examination at a health care facility  (primary encounter diagnosis)  Comment: discussed preventitive healthcare   Plan: Continue to work on healthy diet and exercise, discussed healthy habits     (K21.9) Gastroesophageal reflux disease without esophagitis  Comment: resolved after weight loss  Plan:     (Z78.9) Ketogenic diet  Comment: has lost 40+ pounds  Plan:     (Z13.220) Screening for lipid disorders  Comment: check labs after keto diet  Plan: Lipid Panel ([facility]), Comprehensive Metobolic         Panel ([facility]), VENOUS COLLECTION            (Z13.1) Screening for diabetes mellitus  Comment:   Plan: Comprehensive Metobolic Panel ([facility]), PSA Total         ([facility]), VENOUS COLLECTION            (Z12.5) Special screening for malignant neoplasm of prostate  Comment:   Plan: VENOUS COLLECTION              Patient has been advised of split billing requirements and indicates understanding: Yes  COUNSELING:  Reviewed preventive health counseling, as reflected in patient instructions       Regular exercise       Healthy diet/nutrition       Vision screening       Colorectal cancer screening       Prostate cancer screening    Estimated body mass index is 24.33 kg/m  as calculated from the following:    Height as of this encounter: 1.727 m (5' 8\").    Weight as of this encounter: 72.6 kg (160 lb).        He reports that he has never smoked. He has never used smokeless tobacco.      Counseling Resources:  ATP IV Guidelines  Pooled Cohorts Equation Calculator  FRAX Risk Assessment  ICSI Preventive Guidelines  Dietary Guidelines for Americans, 2010  USDA's MyPlate  ASA Prophylaxis  Lung CA Screening    Humberto Woods MD  [facility]  "

## 2023-01-12 NOTE — NURSING NOTE
Jayjay Delcid is here for a CPX.    Pre-visit planning  Immunizations -up to date  Colonoscopy -is up to date  Mammogram -  Asthma test --  PHQ9 -  JULIETH 7 -      Questioned patient about current smoking habits.  Pt. has never smoked.  Body mass index is 24.33 kg/m .  PULSE regular  My Chart: active  CLASSIFICATION OF OVERWEIGHT AND OBESITY BY BMI                        Obesity Class           BMI(kg/m2)  Underweight                                    < 18.5  Normal                                         18.5-24.9  Overweight                                     25.0-29.9  OBESITY                     I                  30.0-34.9                             II                 35.0-39.9  EXTREME OBESITY             III                >40                            Patient's  BMI Body mass index is 24.33 kg/m .

## 2023-01-13 LAB — ABBOTT PSA - QUEST: 0.34 NG/ML

## 2023-03-20 ENCOUNTER — OFFICE VISIT (OUTPATIENT)
Dept: FAMILY MEDICINE | Facility: CLINIC | Age: 58
End: 2023-03-20

## 2023-03-20 VITALS
TEMPERATURE: 97.8 F | HEART RATE: 58 BPM | BODY MASS INDEX: 24.13 KG/M2 | WEIGHT: 159.2 LBS | RESPIRATION RATE: 20 BRPM | SYSTOLIC BLOOD PRESSURE: 116 MMHG | HEIGHT: 68 IN | DIASTOLIC BLOOD PRESSURE: 64 MMHG | OXYGEN SATURATION: 96 %

## 2023-03-20 DIAGNOSIS — R05.1 ACUTE COUGH: Primary | ICD-10-CM

## 2023-03-20 PROCEDURE — 99213 OFFICE O/P EST LOW 20 MIN: CPT | Performed by: FAMILY MEDICINE

## 2023-03-20 RX ORDER — AZITHROMYCIN 250 MG/1
TABLET, FILM COATED ORAL
Qty: 6 TABLET | Refills: 0 | Status: SHIPPED | OUTPATIENT
Start: 2023-03-20 | End: 2023-03-25

## 2023-03-20 NOTE — PROGRESS NOTES
SUBJECTIVE:   Jayjay Delcid is a 57 year old male who complains of runny nose, cough and cough described as paroxysmal for 28 days-started as cold. He denies a history of productive cough, sweats, myalgias, shortness of breath, vomiting and chest pain and denies a history of asthma. Patient does not smoke cigarettes.    Pt has not tried OTC meds since first week-had cold at first-cough has not resolved    No sneezing or itchy nose    Pt denies GERD symptoms     Pt denies wheezing or nocturnal cough  pt had neg covid test yesterday      Patient Active Problem List   Diagnosis     Health Care Home     ACP (advance care planning)     History reviewed. No pertinent past medical history.  Family History   Problem Relation Age of Onset     Breast Cancer Mother      Hypertension Father      Brain Cancer Brother      Brain Tumor Brother      Breast Cancer Paternal Grandmother      Cerebrovascular Disease No family hx of      C.A.D. No family hx of      Diabetes No family hx of      Cancer - colorectal No family hx of      Coronary Artery Disease No family hx of      Prostate Cancer No family hx of      Colon Cancer No family hx of      Social History     Socioeconomic History     Marital status:      Spouse name: Vaishali     Number of children: Not on file     Years of education: Not on file     Highest education level: Not on file   Occupational History     Occupation:      Employer: [employer]   Tobacco Use     Smoking status: Never     Smokeless tobacco: Never   Substance and Sexual Activity     Alcohol use: Yes     Alcohol/week: 1.7 standard drinks     Types: 2 drink(s) per week     Drug use: No     Sexual activity: Yes     Partners: Female     Birth control/protection: Condom, Diaphragm   Other Topics Concern     Parent/sibling w/ CABG, MI or angioplasty before 65F 55M? Not Asked   Social History Narrative     Not on file     Social Determinants of Health     Financial Resource Strain: Not  on file   Food Insecurity: Not on file   Transportation Needs: Not on file   Physical Activity: Not on file   Stress: Not on file   Social Connections: Not on file   Intimate Partner Violence: Not on file   Housing Stability: Not on file     Past Surgical History:   Procedure Laterality Date     NO HISTORY OF SURGERY       No current outpatient medications on file prior to visit.  No current facility-administered medications on file prior to visit.       Allergies: Patient has no known allergies.    Immunization History   Administered Date(s) Administered     COVID-19 Vaccine 12+ (Pfizer) 12/14/2021     COVID-19 Vaccine 18+ (Moderna) 05/03/2021, 06/01/2021     COVID-19 Vaccine Bivalent Booster 12+ (Pfizer) 09/22/2022     FLU 6-35 months 10/28/2009     Influenza (H1N1) 01/19/2010     Influenza (IIV3) PF 10/28/2009, 11/04/2010, 09/27/2011, 10/02/2013     Influenza Vaccine >6 months (Alfuria,Fluzone) 10/01/2017, 10/02/2018, 10/03/2019, 10/06/2020, 10/06/2021     Influenza Vaccine IM Ages 6-35 Months 4 Valent (PF) 10/17/2017     Influenza Vaccine, 6+MO IM (QUADRIVALENT W/PRESERVATIVES) 09/22/2022     TD (ADULT, 7+) 11/01/2006     TDAP Vaccine (Boostrix) 12/27/2017     Td (Adult), Adsorbed 11/01/2006     Zoster vaccine recombinant adjuvanted (SHINGRIX) 09/29/2022, 12/01/2022       OBJECTIVE:  He appears well, vital signs are as noted by the nurse. Ears normal.  Throat and pharynx normal.  Neck supple. No adenopathy in the neck. Nose is congested. Sinuses non tender. The chest is clear, without wheezes or rales.    ASSESSMENT:   Cough-no clear chronic cough etiology, did have GERD in past     Discussed viral vs. bacterial infections and need to avoid unnecessary prescribing of antibiotics to ensure that no resistance will develop. Will give prescription for antibiotic (see orders) to fill is symptoms do not improve in the next 2-3 days.     PLAN:Zpack  Symptomatic therapy suggested: push fluids and rest. Call or return to  clinic prn if these symptoms worsen or fail to improve as anticipated.     Consider PPI trial vs spirometry/CXR

## 2023-03-22 ENCOUNTER — OFFICE VISIT (OUTPATIENT)
Dept: FAMILY MEDICINE | Facility: CLINIC | Age: 58
End: 2023-03-22

## 2023-03-22 VITALS
HEART RATE: 64 BPM | SYSTOLIC BLOOD PRESSURE: 114 MMHG | OXYGEN SATURATION: 96 % | TEMPERATURE: 97.6 F | RESPIRATION RATE: 20 BRPM | DIASTOLIC BLOOD PRESSURE: 70 MMHG | BODY MASS INDEX: 23.72 KG/M2 | WEIGHT: 156 LBS

## 2023-03-22 DIAGNOSIS — M77.12 LEFT LATERAL EPICONDYLITIS: Primary | ICD-10-CM

## 2023-03-22 PROCEDURE — 99214 OFFICE O/P EST MOD 30 MIN: CPT | Performed by: STUDENT IN AN ORGANIZED HEALTH CARE EDUCATION/TRAINING PROGRAM

## 2023-03-22 NOTE — NURSING NOTE
Chief Complaint   Patient presents with     Consult For     Tennis elbow in left arm, has been around since late January of this year, has been trying to use tennis elbow brace but does not feel it works to well and unsure if he is using it correctly, wants to talk about other options to help       Pre-visit Screening:  Immunizations:  up to date  Colonoscopy:  is up to date  Mammogram: NA  Asthma Action Test/Plan:  NA  PHQ9:  NA  GAD7:  NA  Questioned patient about current smoking habits Pt. has never smoked.  Ok to leave detailed message on voice mail for today's visit only Yes, phone # [phone]

## 2023-03-22 NOTE — PATIENT INSTRUCTIONS
Hand/elbow therapy at   [facility]   [address], [place] [address]  [phone] -- appt line    Can continue brace and try voltaren gel topically    Follow-up if not improving over next several weeks, could consider injection

## 2023-03-22 NOTE — PROGRESS NOTES
ASSESSMENT & PLAN      ICD-10-CM    1. Left lateral epicondylitis  M77.12 Occupational Therapy Referral         Reviewed nature of condition and tx options in detail, agreed on plan below:     Patient Instructions   Hand/elbow therapy at   [facility]   [address] [place] [id number]  [phone] -- appt line    Can continue brace and try voltaren gel topically    Follow-up if not improving over next several weeks, could consider injection      >30 minutes spent on the date of the encounter doing chart review, history and exam, documentation and further activities per the note.    Jose David Gonzalez MD, [facility]      -----    SUBJECTIVE  Jayjay Delcid is a/an 58 year old male who is seen for evaluation of     Chief Complaint   Patient presents with     Consult For     Tennis elbow in left arm, has been around since late January of this year, has been trying to use tennis elbow brace but does not feel it works to well and unsure if he is using it correctly, wants to talk about other options to help     The patient is seen by themselves.  The patient is Right handed    Date of Onset: late January  Mechanism of injury: No acute injury. Has been playing pickleball lefthanded due to R arm soreness, and increased freq in January.  Location of Pain: left lateral elbow  Worsened by: gripping/lifting  Better with: rest  Treatments tried: has tried to decrease activity level, watching some therapy videos online  Associated symptoms: no distal numbness or tingling; denies swelling or warmth    Orthopedic/Surgical history: no prior LUE hx  Social History/Occupation: , uses  mouse, video games, pickleball    Patient's PMH, PSH, and family hx reviewed.      OBJECTIVE:  /70 (BP Location: Right arm, Patient Position: Sitting, Cuff Size: Adult Large)   Pulse 64   Temp 97.6  F (36.4  C) (Temporal)   Resp 20   Wt 70.8 kg (156 lb)   SpO2 96%   BMI 23.72  kg/m     Alert, NAD  NC/AT  Sclerae anicteric  Regular  Resp nonlabored  Skin warm and dry  No focal neuro deficits. Speech intact. Normal gait.  Appropriate affect    L elbow full and symm ROM, no deformity or effusion.   TTP over lat epic only  Pain with resisted wrist ext and supination, strength intact  SILT  Rad pulse intact

## 2023-05-08 ENCOUNTER — OFFICE VISIT (OUTPATIENT)
Dept: FAMILY MEDICINE | Facility: CLINIC | Age: 58
End: 2023-05-08

## 2023-05-08 VITALS
RESPIRATION RATE: 20 BRPM | WEIGHT: 154 LBS | HEART RATE: 64 BPM | DIASTOLIC BLOOD PRESSURE: 72 MMHG | TEMPERATURE: 97.2 F | HEIGHT: 68 IN | BODY MASS INDEX: 23.34 KG/M2 | SYSTOLIC BLOOD PRESSURE: 124 MMHG

## 2023-05-08 DIAGNOSIS — R05.2 SUBACUTE COUGH: Primary | ICD-10-CM

## 2023-05-08 PROCEDURE — 71046 X-RAY EXAM CHEST 2 VIEWS: CPT | Performed by: FAMILY MEDICINE

## 2023-05-08 PROCEDURE — 99213 OFFICE O/P EST LOW 20 MIN: CPT | Performed by: FAMILY MEDICINE

## 2023-05-08 NOTE — PROGRESS NOTES
SUBJECTIVE:   Jayjay Delcid is a 58 year old male who complains of post nasal drip, cough and wheezing for 6+ weeks. He denies a history of productive cough, sweats, myalgias, shortness of breath and chest pain and denies a history of asthma. Patient does not smoke cigarettes.    Pt was seen here for cough 3/20/23 -treated with Zpack, not fully better, symptoms persisting    Pt does have hx seasonal allergies- was using some flonase on and off but only 4 days at a time-seems to help but not taking ongoing    Pt uses occasional antihistamine    Patient Active Problem List   Diagnosis     Health Care Home     ACP (advance care planning)     No past medical history on file.  Family History   Problem Relation Age of Onset     Breast Cancer Mother      Hypertension Father      Brain Cancer Brother      Brain Tumor Brother      Breast Cancer Paternal Grandmother      Cerebrovascular Disease No family hx of      C.A.D. No family hx of      Diabetes No family hx of      Cancer - colorectal No family hx of      Coronary Artery Disease No family hx of      Prostate Cancer No family hx of      Colon Cancer No family hx of      Social History     Socioeconomic History     Marital status:      Spouse name: Vaishali     Number of children: Not on file     Years of education: Not on file     Highest education level: Not on file   Occupational History     Occupation:      Employer: [email or web address]   Tobacco Use     Smoking status: Never     Passive exposure: Never     Smokeless tobacco: Never   Vaping Use     Vaping status: Not on file   Substance and Sexual Activity     Alcohol use: Yes     Alcohol/week: 1.7 standard drinks of alcohol     Types: 2 drink(s) per week     Drug use: No     Sexual activity: Yes     Partners: Female     Birth control/protection: Condom, Diaphragm   Other Topics Concern     Parent/sibling w/ CABG, MI or angioplasty before 65F 55M? Not Asked   Social History Narrative     Not on  "file     Social Determinants of Health     Financial Resource Strain: Not on file   Food Insecurity: Not on file   Transportation Needs: Not on file   Physical Activity: Not on file   Stress: Not on file   Social Connections: Not on file   Intimate Partner Violence: Not on file   Housing Stability: Not on file     Past Surgical History:   Procedure Laterality Date     NO HISTORY OF SURGERY       No current outpatient medications on file prior to visit.  No current facility-administered medications on file prior to visit.       Allergies: Patient has no known allergies.    Immunization History   Administered Date(s) Administered     COVID-19 Bivalent 12+ (Pfizer) 09/22/2022     COVID-19 MONOVALENT 12+ (Pfizer) 12/14/2021     COVID-19 Monovalent 18+ (Moderna) 05/03/2021, 06/01/2021     FLU 6-35 months 10/28/2009     Influenza (H1N1) 01/19/2010     Influenza (IIV3) PF 10/28/2009, 11/04/2010, 09/27/2011, 10/02/2013     Influenza Vaccine >6 months (Alfuria,Fluzone) 10/01/2017, 10/02/2018, 10/03/2019, 10/06/2020, 10/06/2021     Influenza Vaccine IM Ages 6-35 Months 4 Valent (PF) 10/17/2017     Influenza Vaccine, 6+MO IM (QUADRIVALENT W/PRESERVATIVES) 09/22/2022     TD,PF 7+ (Tenivac) 11/01/2006     TDAP Vaccine (Boostrix) 12/27/2017     Td (Adult), Adsorbed 11/01/2006     Zoster recombinant adjuvanted (SHINGRIX) 09/29/2022, 12/01/2022         OBJECTIVE:/72 (BP Location: Left arm, Patient Position: Chair, Cuff Size: Adult Regular)   Pulse 64   Temp 97.2  F (36.2  C) (Temporal)   Resp 20   Ht 1.727 m (5' 8\")   Wt 69.9 kg (154 lb)   BMI 23.42 kg/m     He appears well, vital signs are as noted by the nurse. Ears normal.  Throat and pharynx normal.  Neck supple. No adenopathy in the neck. Nose is congested. Sinuses non tender. The chest is clear, without wheezes or rales.    CXR neg,   Will await radiology over-read and contact patient if any discrepancies     ASSESSMENT:   Cough, suspect allergic " rhinitis    PLAN:Flonasse EVERY DAy, antihistamine daily    Consider allergy testing and spirometry if not better  Symptomatic therapy suggested: push fluids and rest. Call or return to clinic prn if these symptoms worsen or fail to improve as anticipated.

## 2023-05-08 NOTE — NURSING NOTE
Jayjay WALLER Mixter is here for his cough, not any better. Gets worse when laying down at night. Feels he is still coughing up phlegm.    Questioned patient about current smoking habits.  Pt. has never smoked.  PULSE regular  My Chart: active  CLASSIFICATION OF OVERWEIGHT AND OBESITY BY BMI                        Obesity Class           BMI(kg/m2)  Underweight                                    < 18.5  Normal                                         18.5-24.9  Overweight                                     25.0-29.9  OBESITY                     I                  30.0-34.9                             II                 35.0-39.9  EXTREME OBESITY             III                >40                            Patient's  BMI Body mass index is 23.42 kg/m .  http://hin.nhlbi.nih.gov/menuplanner/menu.cgi  Pre-visit planning  Immunizations - up to date  Colonoscopy - is up to date  Mammogram -   Asthma -   PHQ9 -    JULIETH-7 -      The patient has verbalized that it is ok to leave a detailed voice message on the patient's cell phone with results/recommendations from this visit.

## 2023-07-07 ENCOUNTER — TRANSFERRED RECORDS (OUTPATIENT)
Dept: FAMILY MEDICINE | Facility: CLINIC | Age: 58
End: 2023-07-07

## 2023-07-24 ENCOUNTER — OFFICE VISIT (OUTPATIENT)
Dept: FAMILY MEDICINE | Facility: CLINIC | Age: 58
End: 2023-07-24

## 2023-07-24 VITALS
HEIGHT: 68 IN | HEART RATE: 65 BPM | OXYGEN SATURATION: 96 % | SYSTOLIC BLOOD PRESSURE: 108 MMHG | BODY MASS INDEX: 23.19 KG/M2 | WEIGHT: 153 LBS | DIASTOLIC BLOOD PRESSURE: 64 MMHG | TEMPERATURE: 97.3 F

## 2023-07-24 DIAGNOSIS — G89.29 CHRONIC RIGHT SHOULDER PAIN: Primary | ICD-10-CM

## 2023-07-24 DIAGNOSIS — M77.11 LATERAL EPICONDYLITIS OF BOTH ELBOWS: ICD-10-CM

## 2023-07-24 DIAGNOSIS — M25.511 CHRONIC RIGHT SHOULDER PAIN: Primary | ICD-10-CM

## 2023-07-24 DIAGNOSIS — M77.12 LATERAL EPICONDYLITIS OF BOTH ELBOWS: ICD-10-CM

## 2023-07-24 PROCEDURE — 20611 DRAIN/INJ JOINT/BURSA W/US: CPT | Mod: RT | Performed by: STUDENT IN AN ORGANIZED HEALTH CARE EDUCATION/TRAINING PROGRAM

## 2023-07-24 PROCEDURE — 73030 X-RAY EXAM OF SHOULDER: CPT | Mod: RT | Performed by: STUDENT IN AN ORGANIZED HEALTH CARE EDUCATION/TRAINING PROGRAM

## 2023-07-24 PROCEDURE — 99214 OFFICE O/P EST MOD 30 MIN: CPT | Mod: 25 | Performed by: STUDENT IN AN ORGANIZED HEALTH CARE EDUCATION/TRAINING PROGRAM

## 2023-07-24 RX ORDER — TRIAMCINOLONE ACETONIDE 40 MG/ML
80 INJECTION, SUSPENSION INTRA-ARTICULAR; INTRAMUSCULAR ONCE
Status: COMPLETED | OUTPATIENT
Start: 2023-07-24 | End: 2023-07-24

## 2023-07-24 RX ADMIN — TRIAMCINOLONE ACETONIDE 80 MG: 40 INJECTION, SUSPENSION INTRA-ARTICULAR; INTRAMUSCULAR at 16:12

## 2023-07-24 NOTE — NURSING NOTE
Chief Complaint   Patient presents with     Arm Pain     Right arm, tennis elbow, but now having pain in the bicep and shoulder. Reaching outward motion is painful.  Has hx of right shoulder pain from work.  Left tennis elbow is better after PT at [facility] done in March/April 2023         Pre-visit Screening:  Immunizations:  up to date  Colonoscopy:  is up to date  Mammogram: NA  Asthma Action Test/Plan:  NA  PHQ9:  NA  GAD7:  NA  Questioned patient about current smoking habits Pt. has never smoked.  Ok to leave detailed message on voice mail for today's visit only Yes, phone # [phone]

## 2023-07-24 NOTE — PROGRESS NOTES
ASSESSMENT & PLAN    1. Chronic right shoulder pain  XRs obtained and reviewed with patient.   Hx and exam most c/w adhesive capsulitis, but recommend MRI to further evaluate RTC.   - XR Shoulder Right G/E 3 Views  - MR Shoulder Right w/o Contrast; Future  - Radiology Referral; Future    2. Lateral epicondylitis of both elbows  Reviewed tx options for ongoing sx, encouraged cont'd HEP and patient to consider if he would like to pursue any further intervention       Patient Instructions   Consider elbow treatment options and let me know what you think    Shoulder MRI at [facility]  [phone] -- appt line    Follow-up with me after MRI      >30 minutes spent on the date of the encounter doing chart review, history and exam, documentation and further activities per the note.    Jose David Gonzalez MD, [facility]      -----    SUBJECTIVE  Jayjay Delcid is a/an 58 year old male who is seen for evaluation of     Chief Complaint   Patient presents with     Arm Pain     Right arm, tennis elbow, but now having pain in the bicep and shoulder. Reaching outward motion is painful.  Has hx of right shoulder pain from work.  Left tennis elbow is better after PT at [facility] done in March/April 2023       The patient is seen by themselves.  The patient is Right handed    Follow-up bilateral elbow pain, worsening R shoulder pain.   Elbows have improved with therapy but not back to 100%, R slightly more bothersome than left. Bought home therapy machine for elbows. counterforce not helpful.     Shoulder is primary concern  Location of Pain: right diffuse shoulder  Worsened by: motion, reaching up/back  Treatments tried: activity mod, NSAIDs, massage  Associated symptoms: no distal numbness or tingling; denies instability hx    Social History/Occupation: , uses  mouse, video games, pickleball (hasn't been playing recently)    Patient's PMH, PSH, and family hx  "reviewed.      OBJECTIVE:  /64 (BP Location: Left arm, Patient Position: Sitting, Cuff Size: Adult Large)   Pulse 65   Temp 97.3  F (36.3  C) (Temporal)   Ht 1.727 m (5' 8\")   Wt 69.4 kg (153 lb)   SpO2 96%   BMI 23.26 kg/m     Alert, NAD  NC/AT  Sclerae anicteric  Regular  Resp nonlabored  Skin warm and dry  No focal neuro deficits. Speech intact. Normal gait.  Appropriate affect    R shoulder no deformity  AROM 100/45/waist (passive ER also limited to ~50 deg w pain)  Mild TTP over prox biceps and subacromial space only  5/5 str abd, ER, IR within ROM  +empty can  -Speeds  CMS intact distally      RADIOLOGY:  Results for orders placed or performed in visit on 07/24/23   XR Shoulder Right G/E 3 Views     Status: None    Narrative    Radiologist Consultation/:   Fax:  [phone]  _____________________________________________________________________________________________________________________________________________________________________________________________________________________________________________________________________________________________________________________________________________________________________________________________________________________________________________________________________________________________________  PATIENT NAME: HUONG SAMANO  YOB: 1965 Age: 58 ACCESSION NUMBER: [id number]  SEX: M ORDERING PROVIDER: Humberto Woods  FACILITY: [facility] PRIMARY PROVIDER:  PATIENT ID: [id number] INTERESTED PARTY:  Page 1 of " 1  _________________________________________________________________________________________________________________________________________________________________________________________________________________________________________________________________________________________________________________________________________________________________________________________  EXAM: XRAY SHOULDER,2+VWS RIGHT  LOCATION: [facility]  DATE: 7/24/2023  INDICATION: Right shoulder pain  COMPARISON: None.  IMPRESSION: Normal joint spaces and alignment. No fracture.  SIGNED BY: Javier Martínez MD 7/25/2023 11:22 AM

## 2023-07-24 NOTE — NURSING NOTE
Error-entered medication in MAR by accident that was not given to the patient.   Coders informed.

## 2023-07-24 NOTE — PATIENT INSTRUCTIONS
Consider elbow treatment options and let me know what you think    Shoulder MRI at [facility]  [phone] -- appt line    Follow-up with me after MRI

## 2023-08-04 ENCOUNTER — OFFICE VISIT (OUTPATIENT)
Dept: FAMILY MEDICINE | Facility: CLINIC | Age: 58
End: 2023-08-04

## 2023-08-04 VITALS
HEART RATE: 71 BPM | SYSTOLIC BLOOD PRESSURE: 114 MMHG | OXYGEN SATURATION: 97 % | TEMPERATURE: 97.1 F | WEIGHT: 154.6 LBS | DIASTOLIC BLOOD PRESSURE: 72 MMHG | HEIGHT: 68 IN | BODY MASS INDEX: 23.43 KG/M2

## 2023-08-04 DIAGNOSIS — M25.511 CHRONIC RIGHT SHOULDER PAIN: Primary | ICD-10-CM

## 2023-08-04 DIAGNOSIS — M75.01 ADHESIVE CAPSULITIS OF RIGHT SHOULDER: ICD-10-CM

## 2023-08-04 DIAGNOSIS — G89.29 CHRONIC RIGHT SHOULDER PAIN: Primary | ICD-10-CM

## 2023-08-04 PROCEDURE — 99213 OFFICE O/P EST LOW 20 MIN: CPT | Mod: 25 | Performed by: STUDENT IN AN ORGANIZED HEALTH CARE EDUCATION/TRAINING PROGRAM

## 2023-08-04 PROCEDURE — 20611 DRAIN/INJ JOINT/BURSA W/US: CPT | Mod: RT | Performed by: STUDENT IN AN ORGANIZED HEALTH CARE EDUCATION/TRAINING PROGRAM

## 2023-08-04 RX ORDER — TRIAMCINOLONE ACETONIDE 40 MG/ML
80 INJECTION, SUSPENSION INTRA-ARTICULAR; INTRAMUSCULAR ONCE
Status: COMPLETED | OUTPATIENT
Start: 2023-08-04 | End: 2023-08-04

## 2023-08-04 RX ADMIN — TRIAMCINOLONE ACETONIDE 80 MG: 40 INJECTION, SUSPENSION INTRA-ARTICULAR; INTRAMUSCULAR at 15:17

## 2023-08-04 NOTE — PATIENT INSTRUCTIONS
Physical Therapy  [facility]   [address] [place] [id number]  [phone] -- appt line

## 2023-08-04 NOTE — PROGRESS NOTES
"ASSESSMENT & PLAN      ICD-10-CM    1. Chronic right shoulder pain  M25.511     G89.29       2. Adhesive capsulitis of right shoulder  M75.01 Physical Therapy Referral     triamcinolone (KENALOG-40) injection 80 mg     [place] ARTHROCENTESIS ASPIR&/INJ MAJOR JT/BURSA W/US         Reviewed MRI report and images in detail with patient. Hx and exam continue to be most c/w frozen shoulder. We reviewed nature and tx options, r/b/a, offered second opinion. Pt elected to proceed with US-guided GH injection today and PT protocol. Will update me on sx over next few weeks via mychart, follow-up in clinic prn.     >20 minutes spent on the date of the encounter doing chart review, history and exam, documentation and further activities per the note excl procedure.    Jose David Gonzalez MD, [facility]  Primary Care Sports Medicine   -----    SUBJECTIVE:  Jayjay Delcid is a 58 year old male who is seen in follow-up for   Nursing Notes:   Latonya Scott  8/4/2023 12:38 PM  Signed  Chief Complaint   Patient presents with     Consult     Pt is here to discuss MRI results done 7/28/23.       Pre-visit Screening:  Immunizations:  up to date  Colonoscopy:  is up to date  Mammogram: na  Asthma Action Test/Plan:  na  PHQ9:  na  GAD7:  na  Questioned patient about current smoking habits Pt. has never smoked.  Ok to leave detailed message on voice mail for today's visit only yes, phone # [phone]         They were last seen 7/24/2023.   RHD  Since their last visit reports 0% - (About the same as last time).   Maybe slightly worse? No new injury or change in sx.   They have tried rest/activity avoidance, ibuprofen and massage.      The patient is seen by themselves.    Patient's past medical, surgical, social, and family histories were reviewed today and no changes are noted.      OBJECTIVE:  /72 (BP Location: Right arm, Patient Position: Sitting, Cuff Size: Adult Regular)   Pulse 71   Temp 97.1  F (36.2  C) (Oral)   Ht 1.727 m (5' 8\")   " Wt 70.1 kg (154 lb 9.6 oz)   SpO2 97%   BMI 23.51 kg/m     Alert, NAD  NC/AT  Sclerae anicteric  Regular  Resp nonlabored  Skin warm and dry  No focal neuro deficits. Speech intact. Normal gait.  Appropriate affect    R shoulder AROM 100/45/waist (passive ER also limited to ~50 deg w pain)    Imaging:  EXAM: MRI of the RIGHT SHOULDER, without contrast    CLINICAL: Chronic right shoulder pain and decreased range of motion for several weeks.    COMPARISONS: None available.    TECHNICAL: Multiplanar multisequence MRI of the right shoulder was obtained.    SEDATION: None.    CONTRAST: None.    ______________________________________________    FINDINGS:    Evaluation of the fat-saturated sequences is relatively limited by artifact/incomplete fat saturation.    Rotator cuff:    Supraspinatus/Infraspinatus: There is mild focal partial tearing involving the proximal anterior supraspinatus tendon on coronal series 4 image 12. There is minimal partial interstitial tearing of the proximal infraspinatus tendon at the myotendinous junction as seen on sagittal series 6 images 12-14. No significant fatty atrophy of the muscle bellies.    Teres minor: No tendinosis, tear or atrophy.    Subscapularis: There is mild tendinosis with ill-defined articular surface fraying of the distal tendon. Mild thin linear partial interstitial tearing involves the distal tendon on sagittal series 6 images 20-21. No significant fatty atrophy of the muscle belly.    Bursae:    Subacromial-subdeltoid: No significant bursal fluid.    Subcoracoid: No significant bursal fluid.    Coracoacromial arch:    Acromion morphology: Type II. No os acromiale.    Acromiohumeral space: Within normal limits.    Coracohumeral space: Within normal limits.    Biceps tendon, long head: There is mild partial interstitial tearing of the intra-articular tendon as seen on sagittal series 6 image 15-18. Mild to moderate fluid about the imaged proximal extra-articular tendon.  No significant tendon displacement.    Glenohumeral joint:    Physiologic volume of joint fluid.    Articular cartilage: No significant chondral loss.    Capsule: There is thickening and increased signal involving the inferior glenohumeral ligament with mild edema about the inferior glenohumeral ligament. No capsular disruption identified.    Labrum:    Ill-defined degenerative fraying/tearing is seen to involve the anterior and throughout the anteroinferior labrum on axial series 3 images 14-19. Suspect focal tearing of the superior labrum just posterior to the biceps anchor on axial series 4 images 10. No perilabral cyst identified.    Bones:    Evaluation is somewhat limited on the fat-saturated sequences due to artifact/incomplete fat saturation. No evidence of osseous fracture or concerning osseous lesion as visualized.    Acromioclavicular joint:    Moderate changes of arthrosis. No AC joint injury/widening.    ______________________________________________    IMPRESSION:    1. Mild focal partial tearing involving the proximal anterior supraspinatus tendon with minimal partial interstitial tearing of the proximal infraspinatus tendon.    2. Mild tendinosis with ill-defined articular surface fraying of the distal subscapularis tendon. Mild thin linear partial interstitial tearing also involves the distal subscapularis tendon.    3. Mild partial interstitial tearing of the intra-articular long head biceps tendon with mild to moderate fluid about the imaged proximal extra-articular tendon.    4. Ill-defined degenerative fraying/tearing involving the anterior and throughout the anteroinferior labrum with suspected focal tearing of the superior labrum just posterior to the biceps anchor.    5. Appearance of the inferior glenohumeral ligament which can be seen in patients with adhesive capsulitis.    6. Moderate AC joint arthrosis.    JCZ        Electronically signed on 7/28/2023 4:33:00 PM by Russell Simms  D.O.              PROCEDURE: RIGHT Glenohumeral Injection - Ultrasound Guided  The patient was informed of the risks and the benefits of the procedure and a written consent was signed.  The patient s R shoulder was prepped with chlorhexidine in sterile fashion.   80 mg of triamcinolone suspension was drawn up into a 10 mL syringe with 8 mL of 1% lidocaine w/o Epi.  Injection was performed using sterile technique.  Under ultrasound guidance a 3.5-inch 22-gauge needle was used to enter the R glenohumeral joint.  Posterior approach was used with the patient in lateral recumbent position, arm in neutral position at the side.  Needle placement was visualized and documented with ultrasound.  Ultrasound visualization was necessary due to the small joint space entered.  Injection performed long axis to the probe.  Injection solution visualized within the joint space.  Images were permanently stored for the patient's record.  There were no complications. The patient tolerated the procedure well with good immediate relief. There was negligible bleeding.   Therapy scheduled to follow for mobilization.  The patient was instructed to call or go to the emergency room with any unusual pain, swelling, redness, or if otherwise concerned.

## 2023-08-04 NOTE — NURSING NOTE
Chief Complaint   Patient presents with     Consult     Pt is here to discuss MRI results done 7/28/23.       Pre-visit Screening:  Immunizations:  up to date  Colonoscopy:  is up to date  Mammogram: na  Asthma Action Test/Plan:  na  PHQ9:  na  GAD7:  na  Questioned patient about current smoking habits Pt. has never smoked.  Ok to leave detailed message on voice mail for today's visit only yes, phone # [phone]

## 2023-09-29 ENCOUNTER — APPOINTMENT (OUTPATIENT)
Dept: GENERAL RADIOLOGY | Facility: CLINIC | Age: 58
DRG: 202 | End: 2023-09-29
Attending: EMERGENCY MEDICINE
Payer: COMMERCIAL

## 2023-09-29 ENCOUNTER — HOSPITAL ENCOUNTER (INPATIENT)
Facility: CLINIC | Age: 58
LOS: 1 days | Discharge: HOME | DRG: 202 | End: 2023-09-30
Attending: EMERGENCY MEDICINE | Admitting: INTERNAL MEDICINE
Payer: COMMERCIAL

## 2023-09-29 DIAGNOSIS — J98.01 ACUTE BRONCHOSPASM: ICD-10-CM

## 2023-09-29 DIAGNOSIS — J96.01 ACUTE RESPIRATORY FAILURE WITH HYPOXIA (H): ICD-10-CM

## 2023-09-29 LAB
ANION GAP SERPL CALCULATED.3IONS-SCNC: 10 MMOL/L (ref 7–15)
BASOPHILS # BLD AUTO: 0.1 10E3/UL (ref 0–0.2)
BASOPHILS NFR BLD AUTO: 1 %
BUN SERPL-MCNC: 13.4 MG/DL (ref 8–23)
CALCIUM SERPL-MCNC: 9.6 MG/DL (ref 8.8–10.2)
CHLORIDE SERPL-SCNC: 103 MMOL/L (ref 98–107)
CREAT SERPL-MCNC: 0.83 MG/DL (ref 0.67–1.17)
DEPRECATED HCO3 PLAS-SCNC: 28 MMOL/L (ref 22–29)
EGFRCR SERPLBLD CKD-EPI 2021: >90 ML/MIN/1.73M2
EOSINOPHIL # BLD AUTO: 1.6 10E3/UL (ref 0–0.7)
EOSINOPHIL NFR BLD AUTO: 17 %
ERYTHROCYTE [DISTWIDTH] IN BLOOD BY AUTOMATED COUNT: 12.1 % (ref 10–15)
FLUAV RNA SPEC QL NAA+PROBE: NEGATIVE
FLUBV RNA RESP QL NAA+PROBE: NEGATIVE
GLUCOSE SERPL-MCNC: 144 MG/DL (ref 70–99)
HCT VFR BLD AUTO: 47.2 % (ref 40–53)
HGB BLD-MCNC: 15.8 G/DL (ref 13.3–17.7)
HOLD SPECIMEN: NORMAL
IMM GRANULOCYTES # BLD: 0 10E3/UL
IMM GRANULOCYTES NFR BLD: 0 %
LACTATE SERPL-SCNC: 2 MMOL/L (ref 0.7–2)
LYMPHOCYTES # BLD AUTO: 0.8 10E3/UL (ref 0.8–5.3)
LYMPHOCYTES NFR BLD AUTO: 8 %
MCH RBC QN AUTO: 31.7 PG (ref 26.5–33)
MCHC RBC AUTO-ENTMCNC: 33.5 G/DL (ref 31.5–36.5)
MCV RBC AUTO: 95 FL (ref 78–100)
MONOCYTES # BLD AUTO: 0.8 10E3/UL (ref 0–1.3)
MONOCYTES NFR BLD AUTO: 9 %
NEUTROPHILS # BLD AUTO: 5.9 10E3/UL (ref 1.6–8.3)
NEUTROPHILS NFR BLD AUTO: 65 %
NRBC # BLD AUTO: 0 10E3/UL
NRBC BLD AUTO-RTO: 0 /100
NT-PROBNP SERPL-MCNC: 48 PG/ML (ref 0–900)
PLATELET # BLD AUTO: 235 10E3/UL (ref 150–450)
POTASSIUM SERPL-SCNC: 4.1 MMOL/L (ref 3.4–5.3)
RBC # BLD AUTO: 4.98 10E6/UL (ref 4.4–5.9)
RSV RNA SPEC NAA+PROBE: NEGATIVE
SARS-COV-2 RNA RESP QL NAA+PROBE: NEGATIVE
SODIUM SERPL-SCNC: 141 MMOL/L (ref 135–145)
WBC # BLD AUTO: 9.2 10E3/UL (ref 4–11)

## 2023-09-29 PROCEDURE — 94640 AIRWAY INHALATION TREATMENT: CPT

## 2023-09-29 PROCEDURE — 96374 THER/PROPH/DIAG INJ IV PUSH: CPT

## 2023-09-29 PROCEDURE — 250N000011 HC RX IP 250 OP 636: Performed by: EMERGENCY MEDICINE

## 2023-09-29 PROCEDURE — 87637 SARSCOV2&INF A&B&RSV AMP PRB: CPT | Performed by: EMERGENCY MEDICINE

## 2023-09-29 PROCEDURE — 83605 ASSAY OF LACTIC ACID: CPT | Performed by: EMERGENCY MEDICINE

## 2023-09-29 PROCEDURE — 36415 COLL VENOUS BLD VENIPUNCTURE: CPT | Performed by: EMERGENCY MEDICINE

## 2023-09-29 PROCEDURE — 71046 X-RAY EXAM CHEST 2 VIEWS: CPT

## 2023-09-29 PROCEDURE — 82310 ASSAY OF CALCIUM: CPT | Performed by: EMERGENCY MEDICINE

## 2023-09-29 PROCEDURE — 83880 ASSAY OF NATRIURETIC PEPTIDE: CPT | Performed by: EMERGENCY MEDICINE

## 2023-09-29 PROCEDURE — 93005 ELECTROCARDIOGRAM TRACING: CPT

## 2023-09-29 PROCEDURE — 99285 EMERGENCY DEPT VISIT HI MDM: CPT | Mod: 25

## 2023-09-29 PROCEDURE — 85025 COMPLETE CBC W/AUTO DIFF WBC: CPT | Performed by: EMERGENCY MEDICINE

## 2023-09-29 PROCEDURE — 250N000009 HC RX 250: Performed by: EMERGENCY MEDICINE

## 2023-09-29 RX ORDER — METHYLPREDNISOLONE SODIUM SUCCINATE 125 MG/2ML
125 INJECTION, POWDER, LYOPHILIZED, FOR SOLUTION INTRAMUSCULAR; INTRAVENOUS ONCE
Status: COMPLETED | OUTPATIENT
Start: 2023-09-29 | End: 2023-09-29

## 2023-09-29 RX ORDER — IPRATROPIUM BROMIDE AND ALBUTEROL SULFATE 2.5; .5 MG/3ML; MG/3ML
3 SOLUTION RESPIRATORY (INHALATION)
Status: COMPLETED | OUTPATIENT
Start: 2023-09-29 | End: 2023-09-29

## 2023-09-29 RX ADMIN — METHYLPREDNISOLONE SODIUM SUCCINATE 125 MG: 125 INJECTION, POWDER, FOR SOLUTION INTRAMUSCULAR; INTRAVENOUS at 22:57

## 2023-09-29 RX ADMIN — IPRATROPIUM BROMIDE AND ALBUTEROL SULFATE 3 ML: .5; 3 SOLUTION RESPIRATORY (INHALATION) at 22:52

## 2023-09-29 RX ADMIN — IPRATROPIUM BROMIDE AND ALBUTEROL SULFATE 3 ML: .5; 3 SOLUTION RESPIRATORY (INHALATION) at 23:00

## 2023-09-29 RX ADMIN — IPRATROPIUM BROMIDE AND ALBUTEROL SULFATE 3 ML: .5; 3 SOLUTION RESPIRATORY (INHALATION) at 23:01

## 2023-09-29 ASSESSMENT — ACTIVITIES OF DAILY LIVING (ADL): ADLS_ACUITY_SCORE: 35

## 2023-09-30 VITALS
SYSTOLIC BLOOD PRESSURE: 129 MMHG | OXYGEN SATURATION: 91 % | WEIGHT: 138.45 LBS | BODY MASS INDEX: 21.05 KG/M2 | TEMPERATURE: 97.5 F | RESPIRATION RATE: 18 BRPM | DIASTOLIC BLOOD PRESSURE: 63 MMHG | HEART RATE: 73 BPM

## 2023-09-30 PROBLEM — J96.01 ACUTE RESPIRATORY FAILURE WITH HYPOXIA (H): Status: ACTIVE | Noted: 2023-09-30

## 2023-09-30 PROBLEM — J98.01 ACUTE BRONCHOSPASM: Status: ACTIVE | Noted: 2023-09-30

## 2023-09-30 PROCEDURE — 250N000009 HC RX 250: Performed by: INTERNAL MEDICINE

## 2023-09-30 PROCEDURE — 120N000001 HC R&B MED SURG/OB

## 2023-09-30 PROCEDURE — 250N000013 HC RX MED GY IP 250 OP 250 PS 637: Performed by: INTERNAL MEDICINE

## 2023-09-30 PROCEDURE — 36415 COLL VENOUS BLD VENIPUNCTURE: CPT | Performed by: INTERNAL MEDICINE

## 2023-09-30 PROCEDURE — 250N000011 HC RX IP 250 OP 636: Performed by: INTERNAL MEDICINE

## 2023-09-30 PROCEDURE — 87040 BLOOD CULTURE FOR BACTERIA: CPT | Performed by: INTERNAL MEDICINE

## 2023-09-30 PROCEDURE — 96376 TX/PRO/DX INJ SAME DRUG ADON: CPT

## 2023-09-30 PROCEDURE — 999N000157 HC STATISTIC RCP TIME EA 10 MIN

## 2023-09-30 PROCEDURE — 999N000156 HC STATISTIC RCP CONSULT EA 30 MIN

## 2023-09-30 PROCEDURE — 250N000012 HC RX MED GY IP 250 OP 636 PS 637: Performed by: INTERNAL MEDICINE

## 2023-09-30 PROCEDURE — 99236 HOSP IP/OBS SAME DATE HI 85: CPT | Mod: FS | Performed by: INTERNAL MEDICINE

## 2023-09-30 PROCEDURE — G0463 HOSPITAL OUTPT CLINIC VISIT: HCPCS | Mod: 25,27

## 2023-09-30 PROCEDURE — G0378 HOSPITAL OBSERVATION PER HR: HCPCS

## 2023-09-30 PROCEDURE — 96372 THER/PROPH/DIAG INJ SC/IM: CPT

## 2023-09-30 PROCEDURE — 99207 PR APP CREDIT; MD BILLING SHARED VISIT: CPT | Performed by: INTERNAL MEDICINE

## 2023-09-30 RX ORDER — GUAIFENESIN 200 MG/10ML
200 LIQUID ORAL EVERY 4 HOURS PRN
Status: DISCONTINUED | OUTPATIENT
Start: 2023-09-30 | End: 2023-09-30 | Stop reason: HOSPADM

## 2023-09-30 RX ORDER — DOXYCYCLINE 100 MG/1
100 CAPSULE ORAL ONCE
Status: DISCONTINUED | OUTPATIENT
Start: 2023-09-30 | End: 2023-09-30 | Stop reason: HOSPADM

## 2023-09-30 RX ORDER — AMOXICILLIN 250 MG
2 CAPSULE ORAL 2 TIMES DAILY PRN
Status: DISCONTINUED | OUTPATIENT
Start: 2023-09-30 | End: 2023-09-30 | Stop reason: HOSPADM

## 2023-09-30 RX ORDER — IPRATROPIUM BROMIDE AND ALBUTEROL SULFATE 2.5; .5 MG/3ML; MG/3ML
3 SOLUTION RESPIRATORY (INHALATION)
Status: DISCONTINUED | OUTPATIENT
Start: 2023-09-30 | End: 2023-09-30

## 2023-09-30 RX ORDER — ALBUTEROL SULFATE 90 UG/1
2 AEROSOL, METERED RESPIRATORY (INHALATION) 4 TIMES DAILY PRN
Qty: 8.5 G | Refills: 0 | Status: SHIPPED | OUTPATIENT
Start: 2023-09-30

## 2023-09-30 RX ORDER — ENOXAPARIN SODIUM 100 MG/ML
40 INJECTION SUBCUTANEOUS EVERY 24 HOURS
Status: DISCONTINUED | OUTPATIENT
Start: 2023-09-30 | End: 2023-09-30 | Stop reason: HOSPADM

## 2023-09-30 RX ORDER — AMOXICILLIN 250 MG
1 CAPSULE ORAL 2 TIMES DAILY PRN
Status: DISCONTINUED | OUTPATIENT
Start: 2023-09-30 | End: 2023-09-30 | Stop reason: HOSPADM

## 2023-09-30 RX ORDER — ALBUTEROL SULFATE 0.83 MG/ML
2.5 SOLUTION RESPIRATORY (INHALATION)
Status: DISCONTINUED | OUTPATIENT
Start: 2023-09-30 | End: 2023-09-30 | Stop reason: HOSPADM

## 2023-09-30 RX ORDER — DOXYCYCLINE 100 MG/1
100 CAPSULE ORAL EVERY 12 HOURS SCHEDULED
Status: DISCONTINUED | OUTPATIENT
Start: 2023-09-30 | End: 2023-09-30

## 2023-09-30 RX ORDER — DOXYCYCLINE 100 MG/1
100 CAPSULE ORAL EVERY 12 HOURS
Qty: 12 CAPSULE | Refills: 0 | Status: SHIPPED | OUTPATIENT
Start: 2023-09-30 | End: 2023-10-06

## 2023-09-30 RX ORDER — CALCIUM CARBONATE 500 MG/1
1000 TABLET, CHEWABLE ORAL 4 TIMES DAILY PRN
Status: DISCONTINUED | OUTPATIENT
Start: 2023-09-30 | End: 2023-09-30 | Stop reason: HOSPADM

## 2023-09-30 RX ORDER — BENZONATATE 100 MG/1
100 CAPSULE ORAL 3 TIMES DAILY PRN
Status: DISCONTINUED | OUTPATIENT
Start: 2023-09-30 | End: 2023-09-30 | Stop reason: HOSPADM

## 2023-09-30 RX ORDER — METHYLPREDNISOLONE SODIUM SUCCINATE 125 MG/2ML
60 INJECTION, POWDER, LYOPHILIZED, FOR SOLUTION INTRAMUSCULAR; INTRAVENOUS EVERY 8 HOURS
Status: DISCONTINUED | OUTPATIENT
Start: 2023-09-30 | End: 2023-09-30

## 2023-09-30 RX ORDER — PREDNISONE 10 MG/1
TABLET ORAL
Qty: 17 TABLET | Refills: 0 | Status: SHIPPED | OUTPATIENT
Start: 2023-09-30 | End: 2024-08-19

## 2023-09-30 RX ORDER — POLYETHYLENE GLYCOL 3350 17 G/17G
17 POWDER, FOR SOLUTION ORAL DAILY PRN
Status: DISCONTINUED | OUTPATIENT
Start: 2023-09-30 | End: 2023-09-30 | Stop reason: HOSPADM

## 2023-09-30 RX ORDER — BISACODYL 10 MG
10 SUPPOSITORY, RECTAL RECTAL DAILY PRN
Status: DISCONTINUED | OUTPATIENT
Start: 2023-09-30 | End: 2023-09-30 | Stop reason: HOSPADM

## 2023-09-30 RX ORDER — DOXYCYCLINE 100 MG/1
100 CAPSULE ORAL EVERY 12 HOURS SCHEDULED
Status: DISCONTINUED | OUTPATIENT
Start: 2023-09-30 | End: 2023-09-30 | Stop reason: HOSPADM

## 2023-09-30 RX ORDER — PREDNISONE 20 MG/1
40 TABLET ORAL DAILY
Status: DISCONTINUED | OUTPATIENT
Start: 2023-09-30 | End: 2023-09-30 | Stop reason: HOSPADM

## 2023-09-30 RX ORDER — IPRATROPIUM BROMIDE AND ALBUTEROL SULFATE 2.5; .5 MG/3ML; MG/3ML
3 SOLUTION RESPIRATORY (INHALATION) EVERY 4 HOURS PRN
Status: DISCONTINUED | OUTPATIENT
Start: 2023-09-30 | End: 2023-09-30 | Stop reason: HOSPADM

## 2023-09-30 RX ORDER — ONDANSETRON 4 MG/1
4 TABLET, ORALLY DISINTEGRATING ORAL EVERY 6 HOURS PRN
Status: DISCONTINUED | OUTPATIENT
Start: 2023-09-30 | End: 2023-09-30 | Stop reason: HOSPADM

## 2023-09-30 RX ORDER — ONDANSETRON 2 MG/ML
4 INJECTION INTRAMUSCULAR; INTRAVENOUS EVERY 6 HOURS PRN
Status: DISCONTINUED | OUTPATIENT
Start: 2023-09-30 | End: 2023-09-30 | Stop reason: HOSPADM

## 2023-09-30 RX ADMIN — ENOXAPARIN SODIUM 40 MG: 40 INJECTION SUBCUTANEOUS at 06:24

## 2023-09-30 RX ADMIN — METHYLPREDNISOLONE SODIUM SUCCINATE 62.5 MG: 125 INJECTION, POWDER, FOR SOLUTION INTRAMUSCULAR; INTRAVENOUS at 06:22

## 2023-09-30 RX ADMIN — IPRATROPIUM BROMIDE AND ALBUTEROL SULFATE 3 ML: .5; 3 SOLUTION RESPIRATORY (INHALATION) at 09:46

## 2023-09-30 RX ADMIN — PREDNISONE 40 MG: 20 TABLET ORAL at 11:57

## 2023-09-30 RX ADMIN — DOXYCYCLINE HYCLATE 100 MG: 100 CAPSULE ORAL at 06:32

## 2023-09-30 ASSESSMENT — ACTIVITIES OF DAILY LIVING (ADL)
ADLS_ACUITY_SCORE: 35

## 2023-09-30 NOTE — PROGRESS NOTES
Patient's After Visit Summary was reviewed with patient   Patient verbalized understanding of After Visit Summary, recommended follow up and was given an opportunity to ask questions.   Discharge medications sent home with patient/family: No, pt to  4 prescriptions at [facility] (albuterol inhaler, prednisone, doxycycline, and omeprazole).   Discharged with spouse to home

## 2023-09-30 NOTE — H&P
[facility]    History and Physical - Hospitalist Service       Date of Admission:  9/29/2023    Assessment & Plan      Jayjay Delcid is a 58 year old male admitted on 9/29/2023. He is generally healthy without chronic medical problems.  In spring of this year, maybe May, he had a protracted viral illness with associated wheezing and shortness of breath.  He tested negative for COVID at that time.  He describes it as a flulike illness.  He never really returned to normal after that.  He has had ongoing intermittent wheezing and shortness of breath since.  These subacute to chronic symptoms had gotten worse over the last 3 to 6 days.  He has had cough, wheezing, shortness of breath, and tachycardia.  He denied fever, chills, chest pain, abdominal pain, nausea, vomiting, diarrhea, and dysuria.  He came to the emergency department last night (9/29/2023) for evaluation.  ED evaluation showed temperature 99.4, heart rate 106, and hypoxia (not documented but per report oxygen saturations were 86% on room air).  He was placed on supplemental oxygen.  He was given Solu-Medrol and DuoNeb.  I was asked to admit him with acute on subacute to chronic bronchospasm with acute hypoxic respiratory failure.    Problem list:    Acute on subacute to chronic bronchospasm  Postviral reactive airway disease (viral illness in May of this year)  Acute hypoxic respiratory failure  -Admit as inpatient  -Continue Solu-Medrol 60 mg IV every 8 hours, DuoNebs 4 times daily, and albuterol nebs every 2 hours as needed.  -Doxycycline 100 mg by mouth twice daily for possible bronchitis and also for anti-inflammatory effect.  -Antitussives as needed  -Wean oxygen as able  -Given the fairly lengthy duration of underlying symptoms I would consider CT if he is not improving relatively quickly.  He should also have pulmonary function test once improved.     Diet: Combination Diet Regular Diet Adult    DVT Prophylaxis: Heparin  SQ  Quintero Catheter: Not present  Lines: None     Cardiac Monitoring: None  Code Status: Full Code      Clinically Significant Risk Factors Present on Admission                                  Disposition Plan      Expected Discharge Date: 10/02/2023                  Miki Novak MD  Hospitalist Service  [facility]  Securely message with [employer] (more info)  Text page via [facility] Paging/Directory     ______________________________________________________________________    Chief Complaint   Cough, wheezing, and shortness of breath    History is obtained from the patient, Dr. Santana, and the medical record    History of Present Illness   Jayjay Delcid is a 58 year old male admitted on 9/29/2023. He is generally healthy without chronic medical problems.  In spring of this year, maybe May, he had a protracted viral illness with associated wheezing and shortness of breath.  He tested negative for COVID at that time.  He describes it as a flulike illness.  He never really returned to normal after that.  He has had ongoing intermittent wheezing and shortness of breath since.  These subacute to chronic symptoms had gotten worse over the last 3 to 6 days.  He has had cough, wheezing, shortness of breath, and tachycardia.  He denied fever, chills, chest pain, abdominal pain, nausea, vomiting, diarrhea, and dysuria.  He came to the emergency department last night (9/29/2023) for evaluation.  ED evaluation showed temperature 99.4, heart rate 106, and hypoxia (not documented but per report oxygen saturations were 86% on room air).  He was placed on supplemental oxygen.  He was given Solu-Medrol and DuoNeb.  I was asked to admit him with acute on subacute to chronic bronchospasm with acute hypoxic respiratory failure.      Past Medical History    No past medical history on file.    Past Surgical History   Past Surgical History:   Procedure Laterality Date    NO HISTORY OF SURGERY         Prior to  Admission Medications   None        Review of Systems    The 10 point Review of Systems is negative other than noted in the HPI or here.     Social History   I have reviewed this patient's social history and updated it with pertinent information if needed.  Social History     Tobacco Use    Smoking status: Never     Passive exposure: Never    Smokeless tobacco: Never   Substance Use Topics    Alcohol use: Yes     Alcohol/week: 1.7 standard drinks of alcohol     Types: 2 drink(s) per week    Drug use: No         Family History   I have reviewed this patient's family history and updated it with pertinent information if needed.  Family History   Problem Relation Age of Onset    Breast Cancer Mother     Hypertension Father     Brain Cancer Brother     Brain Tumor Brother     Breast Cancer Paternal Grandmother     Cerebrovascular Disease No family hx of     C.A.D. No family hx of     Diabetes No family hx of     Cancer - colorectal No family hx of     Coronary Artery Disease No family hx of     Prostate Cancer No family hx of     Colon Cancer No family hx of          Allergies   No Known Allergies     Physical Exam   Vital Signs: Temp: 97.9  F (36.6  C) Temp src: Temporal BP: 120/63 Pulse: 74   Resp: 13 SpO2: 92 % O2 Device: Nasal cannula Oxygen Delivery: 2 LPM  Weight: 149 lbs 7.55 oz    GENERAL: Pleasant and cooperative. No acute distress.  EYES: Pupils equal and round. No scleral erythema or icterus.  ENT: External ears are normal without deformity. Posterior oropharynx is without erythem, swelling, or exudate.  NECK: Supple. No masses or swelling. No tenderness. Thyroid is normal without mass or tenderness.  CHEST: Scattered wheezes to auscultation. Increased work of beathing. No retractions.   CV: Regular rate and rhythm. No JVD. Pulses normal.  ABDOMEN: Bowel sounds present. No tenderness. No masses or hernia.  EXTREMETIES: No clubbing, cyanosis, or ischemia.  SKIN: Warm and dry to touch. No wounds or  rashes.  NEUROLOGIC: Strength and sensation are normal. Deep tendon reflexes are normal. Cranial nerves are normal.      Medical Decision Making       65 MINUTES SPENT BY ME on the date of service doing chart review, history, exam, documentation & further activities per the note.      Data     I have personally reviewed the following data over the past 24 hrs:    9.2  \   15.8   / 235     141 103 13.4 /  144 (H)   4.1 28 0.83 \     Trop: N/A BNP: 48     Procal: N/A CRP: N/A Lactic Acid: 2.0         Imaging results reviewed over the past 24 hrs:   Recent Results (from the past 24 hour(s))   Chest XR,  PA & LAT    Narrative    EXAM: XR CHEST 2 VIEWS  LOCATION: [facility]  DATE: 9/29/2023    INDICATION: SOB   COUGH  COMPARISON: 5/8/2023.      Impression    IMPRESSION: Negative chest.     Recent Labs   Lab 09/29/23  2108   WBC 9.2   HGB 15.8   MCV 95         POTASSIUM 4.1   CHLORIDE 103   CO2 28   BUN 13.4   CR 0.83   ANIONGAP 10   DOMINIK 9.6   *

## 2023-09-30 NOTE — ED NOTES
[facility]  ED Nurse Handoff Report    ED Chief complaint: Cough and Shortness of Breath  . ED Diagnosis:   Final diagnoses:   Acute bronchospasm   Acute respiratory failure with hypoxia (H)       Allergies: No Known Allergies    Code Status: Full Code    Activity level - Baseline/Home:  independent.  Activity Level - Current:   standby.   Lift room needed: No.   Bariatric: No   Needed: No   Isolation: No.   Infection: Not Applicable.     Respiratory status: Nasal cannula    Vital Signs (within 30 minutes):   Vitals:    09/29/23 2101 09/29/23 2300 09/29/23 2315 09/29/23 2330   BP: 131/88 131/73 136/75 128/71   Pulse: 106 96 109 106   Resp: 18 (!) 32 26 20   Temp: 99.4  F (37.4  C)      TempSrc: Oral      SpO2: 93% 93% 92% 94%       Cardiac Rhythm:  ,      Pain level:    Patient confused: No.   Patient Falls Risk: nonskid shoes/slippers when out of bed.   Elimination Status: Has voided     Patient Report - Initial Complaint: sob.   Focused Assessment: bronchospasm, resp failure     Abnormal Results:   Labs Ordered and Resulted from Time of ED Arrival to Time of ED Departure   BASIC METABOLIC PANEL - Abnormal       Result Value    Sodium 141      Potassium 4.1      Chloride 103      Carbon Dioxide (CO2) 28      Anion Gap 10      Urea Nitrogen 13.4      Creatinine 0.83      GFR Estimate >90      Calcium 9.6      Glucose 144 (*)    CBC WITH PLATELETS AND DIFFERENTIAL - Abnormal    WBC Count 9.2      RBC Count 4.98      Hemoglobin 15.8      Hematocrit 47.2      MCV 95      MCH 31.7      MCHC 33.5      RDW 12.1      Platelet Count 235      % Neutrophils 65      % Lymphocytes 8      % Monocytes 9      % Eosinophils 17      % Basophils 1      % Immature Granulocytes 0      NRBCs per 100 WBC 0      Absolute Neutrophils 5.9      Absolute Lymphocytes 0.8      Absolute Monocytes 0.8      Absolute Eosinophils 1.6 (*)     Absolute Basophils 0.1      Absolute Immature Granulocytes 0.0       Absolute NRBCs 0.0     INFLUENZA A/B, RSV, & SARS-COV2 PCR - Normal    Influenza A PCR Negative      Influenza B PCR Negative      RSV PCR Negative      SARS CoV2 PCR Negative     LACTIC ACID WHOLE BLOOD - Normal    Lactic Acid 2.0     NT PROBNP INPATIENT - Normal    N terminal Pro BNP Inpatient 48     BLOOD CULTURE   BLOOD CULTURE        Chest XR,  PA & LAT   Final Result   IMPRESSION: Negative chest.          Treatments provided: O2, abx  Family Comments:   OBS brochure/video discussed/provided to patient:  N/A  ED Medications:   Medications   doxycycline hyclate (VIBRAMYCIN) capsule 100 mg (has no administration in time range)   ipratropium - albuterol 0.5 mg/2.5 mg/3 mL (DUONEB) neb solution 3 mL (3 mLs Nebulization $Given 9/29/23 1493)   methylPREDNISolone sodium succinate (solu-MEDROL) injection 125 mg (125 mg Intravenous $Given 9/29/23 2715)       Drips infusing:  No  For the majority of the shift this patient was Green.   Interventions performed were .    Sepsis treatment initiated: No    Cares/treatment/interventions/medications to be completed following ED care:     ED Nurse Name: Fannie Perez RN  12:28 AM  RECEIVING UNIT ED HANDOFF REVIEW    Above ED Nurse Handoff Report was reviewed: Yes  Reviewed by: Kim Farr RN on September 30, 2023 at 2:12 AM

## 2023-09-30 NOTE — PROGRESS NOTES
"[facility]     Date: 9/30/2023  Admission Dx: acute on subacute to chronic bronchospasm   Pulmonary History none  Home Nebulizer/MDI Use: none  Home Oxygen: none  Acuity Level (RCAT flow sheet): 4  Aerosol Therapy initiated: duoneb Q4 prn, albuterol prn       Pulmonary Hygiene initiated: deep breath and coughing techniques      Volume Expansion initiated: IS      Current Oxygen Requirements: RA   Current SpO2: 91-93%    Re-evaluation date: 10/3/2023    Patient Education: indications and benefits of bronchodilators. Pt stated he is feeling good, denies any SOB. Weaned off O2 for stats remaining in low 90s.       See \"RT Assessments\" flow sheet for patient assessment scoring and Acuity Level Details.           "

## 2023-09-30 NOTE — ED PROVIDER NOTES
"    History     Chief Complaint:  Shortness of breath       HPI   Jayjay Delcid is a 58 year old male who presents with shortness of breath.  The patient states that he has had episodes of coughing and wheezing since he had a \"cold\" earlier this year that he thought was influenza.  In the last 3 days, he has had URI symptoms leading to onset this afternoon of wheezing and cough with shortness of breath.  He does note some sputum production but feels significant short of breath with ambulation.  He denies fevers, chills, chest pain, or any other concerns.        Independent Historian:   Patient       Review of External Notes: reviewed 3/20/23 office visit note     ROS:  Review of Systems    Allergies:  No Known Allergies     Medications:    None        Physical Exam     Patient Vitals for the past 24 hrs:   BP Temp Temp src Pulse Resp SpO2   05/12/23 0247 113/73 97.8  F (36.6  C) Temporal 95 20 98 %        Physical Exam  Constitutional: Alert, attentive  HENT:    Nose: Nose normal.    Mouth/Throat: Oropharynx is clear, mucous membranes are moist   Eyes: EOM are normal.   CV: regular rate and rhythm; no murmurs, rubs or gallups  Chest: Effort mildly increased diffuse expiratory wheezes bilaterally  GI:  There is no tenderness. No distension. Normal bowel sounds  MSK: Normal range of motion.  No lower extremity edema  Neurological: Alert, attentive  Skin: Skin is warm and dry.      Emergency Department Course   ECG    Sinus tachycardia  Septal infarct , age undetermined  Abnormal ECG  No previous ECGs available       Results for orders placed or performed during the hospital encounter of 09/29/23   Chest XR,  PA & LAT     Status: None    Narrative    EXAM: XR CHEST 2 VIEWS  LOCATION: [facility]  DATE: 9/29/2023    INDICATION: SOB   COUGH  COMPARISON: 5/8/2023.      Impression    IMPRESSION: Negative chest.   Symptomatic Influenza A/B, RSV, & SARS-CoV2 PCR (COVID-19) Nasopharyngeal     Status: " Normal    Specimen: Nasopharyngeal; Swab   Result Value Ref Range    Influenza A PCR Negative Negative    Influenza B PCR Negative Negative    RSV PCR Negative Negative    SARS CoV2 PCR Negative Negative    Narrative    Testing was performed using the Xpert Xpress CoV2/Flu/RSV Assay on the Cepheid GeneXpert Instrument. This test should be ordered for the detection of SARS-CoV-2, influenza, and RSV viruses in individuals who meet clinical and/or epidemiological criteria. Test performance is unknown in asymptomatic patients. This test is for in vitro diagnostic use under the FDA EUA for laboratories certified under CLIA to perform high or moderate complexity testing. This test has not been FDA cleared or approved. A negative result does not rule out the presence of PCR inhibitors in the specimen or target RNA in concentration below the limit of detection for the assay. If only one viral target is positive but coinfection with multiple targets is suspected, the sample should be re-tested with another FDA cleared, approved, or authorized test, if coinfection would change clinical management. This test was validated by the [facility]. These laboratories are certified under the Clinical Laboratory Improvement Amendments of 1988 (CLIA-88) as qualified to perform high complexity laboratory testing.   [place] Draw     Status: None    Narrative    The following orders were created for panel order [place] Draw.  Procedure                               Abnormality         Status                     ---------                               -----------         ------                     Extra Blue Top Tube[[id number]]                              Final result               Extra Red Top Tube[[id number]]                               Final result               Extra Green Top (Lithium...[[id number]]                      Final result               Extra Purple Top Tube[[id number]]                            Final  result                 Please view results for these tests on the individual orders.   Extra Blue Top Tube     Status: None   Result Value Ref Range    Hold Specimen JIC    Extra Red Top Tube     Status: None   Result Value Ref Range    Hold Specimen JIC    Extra Green Top (Lithium Heparin) Tube     Status: None   Result Value Ref Range    Hold Specimen JIC    Extra Purple Top Tube     Status: None   Result Value Ref Range    Hold Specimen JIC    Basic metabolic panel (BMP)     Status: Abnormal   Result Value Ref Range    Sodium 141 135 - 145 mmol/L    Potassium 4.1 3.4 - 5.3 mmol/L    Chloride 103 98 - 107 mmol/L    Carbon Dioxide (CO2) 28 22 - 29 mmol/L    Anion Gap 10 7 - 15 mmol/L    Urea Nitrogen 13.4 8.0 - 23.0 mg/dL    Creatinine 0.83 0.67 - 1.17 mg/dL    GFR Estimate >90 >60 mL/min/1.73m2    Calcium 9.6 8.8 - 10.2 mg/dL    Glucose 144 (H) 70 - 99 mg/dL   Lactic acid whole blood     Status: Normal   Result Value Ref Range    Lactic Acid 2.0 0.7 - 2.0 mmol/L   BNP     Status: Normal   Result Value Ref Range    N terminal Pro BNP Inpatient 48 0 - 900 pg/mL   CBC with platelets and differential     Status: Abnormal   Result Value Ref Range    WBC Count 9.2 4.0 - 11.0 10e3/uL    RBC Count 4.98 4.40 - 5.90 10e6/uL    Hemoglobin 15.8 13.3 - 17.7 g/dL    Hematocrit 47.2 40.0 - 53.0 %    MCV 95 78 - 100 fL    MCH 31.7 26.5 - 33.0 pg    MCHC 33.5 31.5 - 36.5 g/dL    RDW 12.1 10.0 - 15.0 %    Platelet Count 235 150 - 450 10e3/uL    % Neutrophils 65 %    % Lymphocytes 8 %    % Monocytes 9 %    % Eosinophils 17 %    % Basophils 1 %    % Immature Granulocytes 0 %    NRBCs per 100 WBC 0 <1 /100    Absolute Neutrophils 5.9 1.6 - 8.3 10e3/uL    Absolute Lymphocytes 0.8 0.8 - 5.3 10e3/uL    Absolute Monocytes 0.8 0.0 - 1.3 10e3/uL    Absolute Eosinophils 1.6 (H) 0.0 - 0.7 10e3/uL    Absolute Basophils 0.1 0.0 - 0.2 10e3/uL    Absolute Immature Granulocytes 0.0 <=0.4 10e3/uL    Absolute NRBCs 0.0 10e3/uL   EKG 12 lead      Status: None (Preliminary result)   Result Value Ref Range    Systolic Blood Pressure  mmHg    Diastolic Blood Pressure  mmHg    Ventricular Rate 102 BPM    Atrial Rate 102 BPM    VT Interval 134 ms    QRS Duration 70 ms     ms    QTc 388 ms    P Axis 83 degrees    R AXIS 72 degrees    T Axis 55 degrees    Interpretation ECG       Sinus tachycardia  Septal infarct , age undetermined  Abnormal ECG  No previous ECGs available     CBC + differential     Status: Abnormal    Narrative    The following orders were created for panel order CBC + differential.  Procedure                               Abnormality         Status                     ---------                               -----------         ------                     CBC with platelets and d...[[id number]]  Abnormal            Final result                 Please view results for these tests on the individual orders.       Emergency Department Course & Assessments:             Interventions:  Medications   doxycycline hyclate (VIBRAMYCIN) capsule 100 mg (has no administration in time range)   ipratropium - albuterol 0.5 mg/2.5 mg/3 mL (DUONEB) neb solution 3 mL (3 mLs Nebulization $Given 9/29/23 3504)   methylPREDNISolone sodium succinate (solu-MEDROL) injection 125 mg (125 mg Intravenous $Given 9/29/23 7530)         Independent Interpretation (X-rays, CTs, rhythm strip):  No consolidation on chest x-ray    Consultations/Discussion of Management or Tests:  Discussed the patient with Shadi Novak MD, admitting hospitalist       Disposition:  The patient was admitted to the hospital under the care of Dr. Novak.     Impression & Plan      Medical Decision Making:  This is a 58-year-old male who presents for evaluation of cough, bronchospasm and acute hypoxia.  Initially, patient presents with bronchospasm and dyspnea, symptoms are improved with DuoNebs and Solu-Medrol therapy.  Viral panel is negative, chest x-ray is clear of consolidation or other acute  process, there is no clinical evidence of ACS or CHF.  Fortunately, the patient is hypoxic requiring 2 L despite therapies.  He is not showing signs of respiratory fatigue to require BiPAP or CPAP therapy.  Given acute hypoxemia, he will be admitted for continued supportive cares.  Suspect emphysema or other interstitial lung disease as a sequela of infection earlier this year, but will likely benefit from pulmonary follow-up testing.    Critical care time: independent of procedures, was 35 minutes.      Diagnosis:  Visit Diagnosis, Associated Orders, and Comments     ICD-10-CM    1. Acute bronchospasm  J98.01       2. Acute respiratory failure with hypoxia (H)  J96.01                  Pedro Santana MD  09/30/23 0037

## 2023-09-30 NOTE — PLAN OF CARE
Goal Outcome Evaluation:        Pt admitted from ER around 02:30. Pt is A&O x4. VSS except on 2 liter O2 nasal canula sat 90s. Pt denies pain, SOB, chest pain, or N/V. No fever, infrequently cough noted.Patient is SBA, voids adequately. SL on on right arm. Plan of care ongoing.

## 2023-09-30 NOTE — DISCHARGE SUMMARY
[facility]  Hospitalist Discharge Summary      Date of Admission:  9/29/2023  Date of Discharge:  9/30/2023  Discharging Provider: Mekhi Duff MD  Discharge Service: Hospitalist Service    Discharge Diagnoses   Acute on subacute to chronic bronchospasm  Postviral reactive airway disease (viral illness in May of this year)  Acute hypoxic respiratory failure    Clinically Significant Risk Factors          Follow-ups Needed After Discharge   Follow-up Appointments     Follow-up and recommended labs and tests       Follow up with primary care provider, Humberto Woods, within 7   days for hospital follow- up.  The following labs/tests are recommended:   PFT for documenting reversible bronchospasm .            Unresulted Labs Ordered in the Past 30 Days of this Admission       Date and Time Order Name Status Description    9/29/2023 10:13 PM Blood Culture Peripheral Blood In process     9/29/2023 10:13 PM Blood Culture Peripheral Blood In process         These results will be followed up by Humberto Woods      Discharge Disposition   Discharged to home  Condition at discharge: Stable    Hospital Course   Jayjay Delcid is a 58 year old male admitted on 9/29/2023. He is generally healthy without chronic medical problems.  In spring of this year, maybe May, he had a protracted viral illness with associated wheezing and shortness of breath.  He tested negative for COVID at that time.  He describes it as a flulike illness.  He never really returned to normal after that.  He has had ongoing intermittent wheezing and shortness of breath since.  These subacute to chronic symptoms had gotten worse over the last 3 to 6 days.  He has had cough, wheezing, shortness of breath, and tachycardia.  He denied fever, chills, chest pain, abdominal pain, nausea, vomiting, diarrhea, and dysuria.  He came to the emergency department last night (9/29/2023) for evaluation.  ED evaluation showed  temperature 99.4, heart rate 106, and hypoxia (not documented but per report oxygen saturations were 86% on room air).  He was placed on supplemental oxygen.  He was given Solu-Medrol and DuoNeb.  He was admitted with acute on subacute to chronic bronchospasm with acute hypoxic respiratory failure.   With IV Solu-Medrol patient's bronchospasm decreased significantly and his oxygen requirements decreased as well.  He was on room air saturating 91%-93%.  I did not hear any bronchospasm in his lungs.   I did explain to him the process of bronchospasm and will be discharging him on treatment with Prilosec 40 mg daily for 6 weeks.  Outpatient pulmonary function test with bronchial challenge could be performed.  We will continue 7 days of doxycycline.  For possible bronchitis though there are no signs of pneumonia on chest x-ray he does not have any leg pain or swelling. .    Consultations This Hospital Stay   None    Code Status   Full Code    Time Spent on this Encounter   I, Mekhi Duff MD, personally saw the patient today and spent less than or equal to 30 minutes discharging this patient.       Mekhi Duff MD  [facility]  [address] [facility] [address]  Phone: [phone]  Fax: [phone]  ______________________________________________________________________    Physical Exam   Vital Signs: Temp: 97.5  F (36.4  C) Temp src: Temporal BP: 129/63 Pulse: 73   Resp: 18 SpO2: 91 % O2 Device: None (Room air) Oxygen Delivery: 2 LPM  Weight: 138 lbs 7.18 oz    GENERAL: Patient does not look in any acute distress  HEENT: EOM+, Conjunctiva is clear   NECK:  no Jugular Venous distention  HEART: S1 S2 regular  Rate and Rhythm,  no murmur,    LUNGS: Respirations are  not laboured, Lungs are clear to auscultation I did not hear any crepitations or Wheezing   ABDOMEN: Soft , there is  tenderness , Bowel Sounds are  Positive   LOWER LIMBS: No Pedal Edema  Bilaterally   CNS:  Alert,  Oriented x 3, Moving all the Four Limbs          Primary Care Physician   Humberto Woods    Discharge Orders      Reason for your hospital stay    Cough, wheezing, and shortness of breath     Follow-up and recommended labs and tests     Follow up with primary care provider, Humberto Woods, within 7 days for hospital follow- up.  The following labs/tests are recommended: PFT for documenting reversible bronchospasm .     Activity    Your activity upon discharge: activity as tolerated     When to contact your care team    Call your primary doctor if you have any of the following:  increased shortness of breath, increased wheezing, cough, or fever greater than 100.3  F.     Diet    Follow this diet upon discharge: Orders Placed This Encounter      Combination Diet Regular Diet Adult       Significant Results and Procedures   Most Recent 3 CBC's:  Recent Labs   Lab Test 09/29/23 2108   WBC 9.2   HGB 15.8   MCV 95        Most Recent 3 BMP's:  Recent Labs   Lab Test 09/29/23  2108 01/12/23  1335 12/27/17  1652    140.0 142   POTASSIUM 4.1 4.22 4.1   CHLORIDE 103 102.6 105   CO2 28 31.4 27   BUN 13.4 16  16.8 18  NOT APPLICABLE   CR 0.83 0.95 1.15   ANIONGAP 10  --   --    DOMINIK 9.6 9.7 9.3   * 86 102*   ,   Results for orders placed or performed during the hospital encounter of 09/29/23   Chest XR,  PA & LAT    Narrative    EXAM: XR CHEST 2 VIEWS  LOCATION: [facility]  DATE: 9/29/2023    INDICATION: SOB   COUGH  COMPARISON: 5/8/2023.      Impression    IMPRESSION: Negative chest.       Discharge Medications   Current Discharge Medication List        START taking these medications    Details   albuterol (PROAIR HFA/PROVENTIL HFA/VENTOLIN HFA) 108 (90 Base) MCG/ACT inhaler Inhale 2 puffs into the lungs 4 times daily as needed for shortness of breath, wheezing or cough  Qty: 8.5 g, Refills: 0    Comments: Pharmacy may dispense brand covered by insurance (Proair, or  proventil or ventolin or generic albuterol inhaler)  Associated Diagnoses: Acute bronchospasm      doxycycline hyclate (VIBRAMYCIN) 100 MG capsule Take 1 capsule (100 mg) by mouth every 12 hours for 6 days  Qty: 12 capsule, Refills: 0    Associated Diagnoses: Acute bronchospasm      omeprazole (PRILOSEC) 20 MG DR capsule Take 1 capsule (20 mg) by mouth daily for 45 days  Qty: 45 capsule, Refills: 0    Associated Diagnoses: Acute bronchospasm      predniSONE (DELTASONE) 10 MG tablet 40 +  Qty: 17 tablet, Refills: 0    Comments: 40 mg oral daily x 2 days then, 20 mg daily x 3 days then 10 mg daily x 3 days then discontinue  Associated Diagnoses: Acute bronchospasm           CONTINUE these medications which have NOT CHANGED    Details   diphenhydrAMINE HCl (BENADRYL PO)            STOP taking these medications       Pseudoeph-Doxylamine-DM-APAP (NYQUIL PO) Comments:   Reason for Stopping:             Allergies   No Known Allergies

## 2023-09-30 NOTE — PHARMACY-ADMISSION MEDICATION HISTORY
Pharmacist Admission Medication History    Admission medication history is complete. The information provided in this note is only as accurate as the sources available at the time of the update.    Medication reconciliation/reorder completed by provider prior to medication history? No    Information Source(s): Patient via in-person    Pertinent Information: -    Changes made to PTA medication list:  Added: all meds  Deleted: None  Changed: None    Medication Affordability:  Not including over the counter (OTC) medications, was there a time in the past 3 months when you did not take your medications as prescribed because of cost?: No    Allergies reviewed with patient and updates made in EHR: yes    Medication History Completed By: Dane Leyva RP 9/30/2023 9:05 AM    Prior to Admission medications    Medication Sig Last Dose Taking? Auth Provider Long Term End Date   diphenhydrAMINE HCl (BENADRYL PO)   Yes Unknown, Entered By History     Pseudoeph-Doxylamine-DM-APAP (NYQUIL PO)   Yes Unknown, Entered By History

## 2023-09-30 NOTE — ED TRIAGE NOTES
Arrives from home. States that he is having increased HR, SOB, cough and difficulty breathing, states this is NOT new, but that it has gotten worse.   States last Saturday had a bad flair up.   Did not check temp at home. Claims feverish at home. States also has a known cold onto of symptoms.

## 2023-10-02 ENCOUNTER — CARE COORDINATION (OUTPATIENT)
Dept: FAMILY MEDICINE | Facility: CLINIC | Age: 58
End: 2023-10-02

## 2023-10-02 ENCOUNTER — OFFICE VISIT (OUTPATIENT)
Dept: FAMILY MEDICINE | Facility: CLINIC | Age: 58
End: 2023-10-02

## 2023-10-02 VITALS
BODY MASS INDEX: 23.07 KG/M2 | OXYGEN SATURATION: 92 % | WEIGHT: 152.2 LBS | RESPIRATION RATE: 20 BRPM | DIASTOLIC BLOOD PRESSURE: 62 MMHG | SYSTOLIC BLOOD PRESSURE: 130 MMHG | TEMPERATURE: 97.6 F | HEART RATE: 80 BPM | HEIGHT: 68 IN

## 2023-10-02 DIAGNOSIS — J98.01 ACUTE BRONCHOSPASM: Primary | ICD-10-CM

## 2023-10-02 DIAGNOSIS — J96.01 ACUTE RESPIRATORY FAILURE WITH HYPOXIA (H): ICD-10-CM

## 2023-10-02 DIAGNOSIS — R05.3 CHRONIC COUGH: ICD-10-CM

## 2023-10-02 LAB
ATRIAL RATE - MUSE: 102 BPM
DIASTOLIC BLOOD PRESSURE - MUSE: NORMAL MMHG
INTERPRETATION ECG - MUSE: NORMAL
P AXIS - MUSE: 83 DEGREES
PR INTERVAL - MUSE: 134 MS
QRS DURATION - MUSE: 70 MS
QT - MUSE: 298 MS
QTC - MUSE: 388 MS
R AXIS - MUSE: 72 DEGREES
SYSTOLIC BLOOD PRESSURE - MUSE: NORMAL MMHG
T AXIS - MUSE: 55 DEGREES
VENTRICULAR RATE- MUSE: 102 BPM

## 2023-10-02 PROCEDURE — 99496 TRANSJ CARE MGMT HIGH F2F 7D: CPT | Performed by: FAMILY MEDICINE

## 2023-10-02 NOTE — NURSING NOTE
Jayjay Dlecid is here for a hospital F/U.    Questioned patient about current smoking habits.  Pt. has never smoked.  PULSE regular  My Chart: active  CLASSIFICATION OF OVERWEIGHT AND OBESITY BY BMI                        Obesity Class           BMI(kg/m2)  Underweight                                    < 18.5  Normal                                         18.5-24.9  Overweight                                     25.0-29.9  OBESITY                     I                  30.0-34.9                             II                 35.0-39.9  EXTREME OBESITY             III                >40                            Patient's  BMI Body mass index is 23.14 kg/m .  http://hin.nhlbi.nih.gov/menuplanner/menu.cgi  Pre-visit planning  Immunizations - discussed  Colonoscopy - is up to date  Mammogram -   Asthma -   PHQ9 -    JULIETH-7 -      The patient has verbalized that it is ok to leave a detailed voice message on the patient's cell phone with results/recommendations from this visit.

## 2023-10-02 NOTE — PROGRESS NOTES
Care Coordination Initial Assessment    The patient was admitted into [facility] on 9/29/23 for an acute respiratory failure with hypoxia. He was discharged on 9/30/23 with instructions to follow up with PCP.    PCP: Humberto Woods    Referral Source:  ED/IP List    Utilization:   Last PCP Appt.: 8/4/23    Health Maintenance Reviewed: Yes    Current Medical Health Concerns:   Please review patients current medical problem list.    Patient/Caregiver Understanding: Yes    Medication Management:   Patient has understanding of regimen and is adherent:  Yes    Functional Status:   Independent with all ADL/IADL's    Current Behavioral Health Concerns:   No concerns-patient did not have time to review much over the phone     Patient/Caregiver Understanding:  Yes    Psychosocial:  Patient did not have time to review this over the phone today     Gaps:    NA    Resources Given:    NA    Plan:   I called the patient to see how he is doing after his stay in the hospital. He stated that he was doing alright-he was able to get scheduled for his hospital follow up visit with Dr. Woods today at 3:45pm.

## 2023-10-02 NOTE — PROGRESS NOTES
Assessment & Plan     Acute bronchospasm  Improved with albuterol, prednisone    Acute respiratory failure with hypoxia (H)  improved    Chronic cough  differential diagnosis includes infection vs. allergies vs. asthma vs. gastroesophageal reflux disease vs. medication side effect     Unclear etiology, at this juncture clearly needs PFts and pulmonary evaluation- we agre to refer      Review of the result(s) of each unique test - ER visit and studies       MED REC REQUIRED  Post Medication Reconciliation Status: discharge medications reconciled, continue medications without change  CONSULTATION/REFERRAL to pulmonary    No follow-ups on file.    Humberto Woods MD  [facility]    Wendy Ro is a 58 year old, presenting for the following health issues:  Hospital F/U    hospitals         Hospital Follow-up Visit:    Hospital/Nursing Home/IP Rehab Facility: [facility]  Date of Admission: 9/29/23  Date of Discharge: 9/30/23  Reason(s) for Admission: Acute on subacute to chronic bronchospasm  Postviral reactive airway disease (viral illness in May of this year)  Acute hypoxic respiratory failure- treated with albuterol, omeprazole, prednisone, doxycycline    Was your hospitalization related to COVID-19? No   Problems taking medications regularly:  None  Medication changes since discharge: None  Problems adhering to non-medication therapy:  None    Summary of hospitalization:  [facility] discharge summary reviewed  Diagnostic Tests/Treatments reviewed.  Follow up needed: PFTs  Other Healthcare Providers Involved in Patient s Care:         None  Update since discharge: stable.         Plan of care communicated with patient             Pt has had chronic cough issues for over a year- previously tried on abx, PPI, Flonase-never clear if these helped      Review of Systems   Constitutional, HEENT, cardiovascular, pulmonary, gi and gu systems are negative,  "except as otherwise noted.      Objective    /62 (BP Location: Left arm, Patient Position: Chair, Cuff Size: Adult Regular)   Pulse 80   Temp 97.6  F (36.4  C) (Temporal)   Resp 20   Ht 1.727 m (5' 8\")   Wt 69 kg (152 lb 3.2 oz)   BMI 23.14 kg/m    Body mass index is 23.14 kg/m .  Physical Exam   GENERAL: healthy, alert and no distress  EYES: Eyes grossly normal to inspection, PERRL and conjunctivae and sclerae normal  HENT: ear canals and TM's normal, nose and mouth without ulcers or lesions  NECK: no adenopathy, no asymmetry, masses, or scars and thyroid normal to palpation  RESP: lungs clear to auscultation - no rales, rhonchi or wheezes  CV: regular rate and rhythm, normal S1 S2, no S3 or S4, no murmur, click or rub, no peripheral edema and peripheral pulses strong  ABDOMEN: soft, nontender, no hepatosplenomegaly, no masses and bowel sounds normal  MS: no gross musculoskeletal defects noted, no edema  PSYCH: mentation appears normal, affect normal/bright    Admission on 09/29/2023, Discharged on 09/30/2023   Component Date Value Ref Range Status    Influenza A PCR 09/29/2023 Negative  Negative Final    Influenza B PCR 09/29/2023 Negative  Negative Final    RSV PCR 09/29/2023 Negative  Negative Final    SARS CoV2 PCR 09/29/2023 Negative  Negative Final    NEGATIVE: SARS-CoV-2 (COVID-19) RNA not detected, presumed negative.    Hold Specimen 09/29/2023 [facility]   Final    Hold Specimen 09/29/2023 [facility]   Final    Hold Specimen 09/29/2023 [facility]   Final    Hold Specimen 09/29/2023 [facility]   Final    Sodium 09/29/2023 141  135 - 145 mmol/L Final    Reference intervals for this test were updated on 09/26/2023 to more accurately reflect our healthy population. There may be differences in the flagging of prior results with similar values performed with this method. Interpretation of those prior results can be made in the context of the updated reference intervals.     Potassium 09/29/2023 4.1  3.4 - 5.3 mmol/L Final    " Chloride 09/29/2023 103  98 - 107 mmol/L Final    Carbon Dioxide (CO2) 09/29/2023 28  22 - 29 mmol/L Final    Anion Gap 09/29/2023 10  7 - 15 mmol/L Final    Urea Nitrogen 09/29/2023 13.4  8.0 - 23.0 mg/dL Final    Creatinine 09/29/2023 0.83  0.67 - 1.17 mg/dL Final    GFR Estimate 09/29/2023 >90  >60 mL/min/1.73m2 Final    Calcium 09/29/2023 9.6  8.8 - 10.2 mg/dL Final    Glucose 09/29/2023 144 (H)  70 - 99 mg/dL Final    Culture 09/30/2023 No growth after 2 days   Preliminary    Culture 09/30/2023 No growth after 2 days   Preliminary    Lactic Acid 09/29/2023 2.0  0.7 - 2.0 mmol/L Final    N terminal Pro BNP Inpatient 09/29/2023 48  0 - 900 pg/mL Final    Reference range shown and results flagged as abnormal are suggested inpatient cut points for confirming diagnosis if CHF in an acute setting. Establishing a baseline value for each individual patient is useful for follow-up. An inpatient or emergency department NT-proPBNP <300 pg/mL effectively rules out acute CHF, with 99% negative predictive value.    The outpatient non-acute reference range for ruling out CHF is:  0-125 pg/mL (age 18 to less than 75)  0-450 pg/mL (age 75 yrs and older)     Ventricular Rate 09/29/2023 102  BPM Final    Atrial Rate 09/29/2023 102  BPM Final    SD Interval 09/29/2023 134  ms Final    QRS Duration 09/29/2023 70  ms Final    QT 09/29/2023 298  ms Final    QTc 09/29/2023 388  ms Final    P Axis 09/29/2023 83  degrees Final    R AXIS 09/29/2023 72  degrees Final    T Axis 09/29/2023 55  degrees Final    Interpretation ECG 09/29/2023    Final                    Value:Sinus tachycardia  Septal infarct , age undetermined  Abnormal ECG  No previous ECGs available  Confirmed by - EMERGENCY ROOM, PHYSICIAN ([id number]),  ASIF PULIDO ([id number]) on 10/2/2023 7:15:19 AM      WBC Count 09/29/2023 9.2  4.0 - 11.0 10e3/uL Final    RBC Count 09/29/2023 4.98  4.40 - 5.90 10e6/uL Final    Hemoglobin 09/29/2023 15.8  13.3 - 17.7 g/dL Final     Hematocrit 09/29/2023 47.2  40.0 - 53.0 % Final    MCV 09/29/2023 95  78 - 100 fL Final    MCH 09/29/2023 31.7  26.5 - 33.0 pg Final    MCHC 09/29/2023 33.5  31.5 - 36.5 g/dL Final    RDW 09/29/2023 12.1  10.0 - 15.0 % Final    Platelet Count 09/29/2023 235  150 - 450 10e3/uL Final    % Neutrophils 09/29/2023 65  % Final    % Lymphocytes 09/29/2023 8  % Final    % Monocytes 09/29/2023 9  % Final    % Eosinophils 09/29/2023 17  % Final    % Basophils 09/29/2023 1  % Final    % Immature Granulocytes 09/29/2023 0  % Final    NRBCs per 100 WBC 09/29/2023 0  <1 /100 Final    Absolute Neutrophils 09/29/2023 5.9  1.6 - 8.3 10e3/uL Final    Absolute Lymphocytes 09/29/2023 0.8  0.8 - 5.3 10e3/uL Final    Absolute Monocytes 09/29/2023 0.8  0.0 - 1.3 10e3/uL Final    Absolute Eosinophils 09/29/2023 1.6 (H)  0.0 - 0.7 10e3/uL Final    Absolute Basophils 09/29/2023 0.1  0.0 - 0.2 10e3/uL Final    Absolute Immature Granulocytes 09/29/2023 0.0  <=0.4 10e3/uL Final    Absolute NRBCs 09/29/2023 0.0  10e3/uL Final

## 2023-10-05 LAB
BACTERIA BLD CULT: NO GROWTH
BACTERIA BLD CULT: NO GROWTH

## 2023-10-11 ENCOUNTER — TELEPHONE (OUTPATIENT)
Dept: FAMILY MEDICINE | Facility: CLINIC | Age: 58
End: 2023-10-11

## 2023-11-22 ENCOUNTER — ALLIED HEALTH/NURSE VISIT (OUTPATIENT)
Dept: FAMILY MEDICINE | Facility: CLINIC | Age: 58
End: 2023-11-22

## 2023-11-22 DIAGNOSIS — Z23 NEED FOR VACCINATION: Primary | ICD-10-CM

## 2023-11-22 PROCEDURE — 90686 IIV4 VACC NO PRSV 0.5 ML IM: CPT | Performed by: FAMILY MEDICINE

## 2023-11-22 PROCEDURE — 90471 IMMUNIZATION ADMIN: CPT | Performed by: FAMILY MEDICINE

## 2023-11-30 PROCEDURE — 96372 THER/PROPH/DIAG INJ SC/IM: CPT

## 2024-03-24 ENCOUNTER — HEALTH MAINTENANCE LETTER (OUTPATIENT)
Age: 59
End: 2024-03-24

## 2024-08-19 ENCOUNTER — OFFICE VISIT (OUTPATIENT)
Dept: FAMILY MEDICINE | Facility: CLINIC | Age: 59
End: 2024-08-19

## 2024-08-19 VITALS
TEMPERATURE: 97.9 F | WEIGHT: 184 LBS | BODY MASS INDEX: 27.98 KG/M2 | OXYGEN SATURATION: 97 % | HEART RATE: 65 BPM | SYSTOLIC BLOOD PRESSURE: 118 MMHG | DIASTOLIC BLOOD PRESSURE: 72 MMHG

## 2024-08-19 DIAGNOSIS — J32.8 OTHER CHRONIC SINUSITIS: Primary | ICD-10-CM

## 2024-08-19 DIAGNOSIS — Z91.09 ENVIRONMENTAL ALLERGIES: ICD-10-CM

## 2024-08-19 PROCEDURE — 99214 OFFICE O/P EST MOD 30 MIN: CPT | Performed by: FAMILY MEDICINE

## 2024-08-19 PROCEDURE — G2211 COMPLEX E/M VISIT ADD ON: HCPCS | Performed by: FAMILY MEDICINE

## 2024-08-19 RX ORDER — FLUTICASONE PROPIONATE AND SALMETEROL 250; 50 UG/1; UG/1
1 POWDER RESPIRATORY (INHALATION) EVERY 12 HOURS
COMMUNITY
Start: 2024-07-26

## 2024-08-19 NOTE — PROGRESS NOTES
Assessment & Plan   Problem List Items Addressed This Visit    None  Visit Diagnoses       Other chronic sinusitis    -  Primary    Relevant Medications    fluticasone-salmeterol (ADVAIR) 250-50 MCG/ACT inhaler    amoxicillin-clavulanate (AUGMENTIN) 875-125 MG tablet           1. Other chronic sinusitis  Chronic infection sinuses, treat with oral antibiotics.  - amoxicillin-clavulanate (AUGMENTIN) 875-125 MG tablet; Take 1 tablet by mouth 2 times daily for 10 days  Dispense: 20 tablet; Refill: 0         2. Environmental allergies  Treat with otc flonase. Continue with his advair.          FUTURE APPOINTMENTS:       - Follow-up visit as needed. We manage his chronic medical care.    No follow-ups on file.    Rina Felipe MD  [facility]    Subjective     Nursing Notes:   Gris Savage CMA  8/19/2024  1:30 PM  Signed  Chief Complaint   Patient presents with    Sinus Problem     Sinus pressure and congestion for the last 3-4 weeks, yellow/ green mucous when blowing his nose, he is using OTC sinus spray      Pre-visit Screening:  Immunizations:  up to date  Colonoscopy:  is up to date  Mammogram: NA  Asthma Action Test/Plan:  NA  PHQ9:  NA  GAD7:  NA  Questioned patient about current smoking habits Pt. has never smoked.  Ok to leave detailed message on voice mail for today's visit only Yes, phone # [phone]       Jayjay Delcid is a 59 year old male who presents to clinic today for the following health issues   HPI       Here with sinus infection 3-4 weeks, most recently stuffy nose, no fevers and no shortness of breath. Doesn't feel like a cold, just a stuffy nose. Has been using otc afrin for 2 days, advair.    Had a couple of colds over the summer also has bad allergies to pollen.       Review of Systems   Constitutional, HEENT, cardiovascular, pulmonary, gi and gu systems are negative, except as otherwise noted.      Objective    /72 (BP Location: Right arm, Patient Position:  Sitting, Cuff Size: Adult Large)   Pulse 65   Temp 97.9  F (36.6  C) (Temporal)   Wt 83.5 kg (184 lb)   SpO2 97%   BMI 27.98 kg/m    Body mass index is 27.98 kg/m .  Physical Exam   GENERAL: alert and no distress  RESP: lungs clear to auscultation - no rales, rhonchi or wheezes  CV: regular rate and rhythm, normal S1 S2, no S3 or S4, no murmur, click or rub, no peripheral edema  MS: no gross musculoskeletal defects noted, no edema  NEURO: Normal strength and tone, mentation intact and speech normal  PSYCH: mentation appears normal, affect normal/bright    No results found for any visits on 08/19/24.

## 2024-08-19 NOTE — NURSING NOTE
Chief Complaint   Patient presents with    Sinus Problem     Sinus pressure and congestion for the last 3-4 weeks, yellow/ green mucous when blowing his nose, he is using OTC sinus spray      Pre-visit Screening:  Immunizations:  up to date  Colonoscopy:  is up to date  Mammogram: NA  Asthma Action Test/Plan:  NA  PHQ9:  NA  GAD7:  NA  Questioned patient about current smoking habits Pt. has never smoked.  Ok to leave detailed message on voice mail for today's visit only Yes, phone # [phone]

## 2025-04-12 ENCOUNTER — HEALTH MAINTENANCE LETTER (OUTPATIENT)
Age: 60
End: 2025-04-12